# Patient Record
Sex: FEMALE | Race: OTHER | HISPANIC OR LATINO
[De-identification: names, ages, dates, MRNs, and addresses within clinical notes are randomized per-mention and may not be internally consistent; named-entity substitution may affect disease eponyms.]

---

## 2019-11-01 ENCOUNTER — APPOINTMENT (OUTPATIENT)
Dept: OBGYN | Facility: CLINIC | Age: 29
End: 2019-11-01

## 2019-11-01 PROBLEM — Z00.00 ENCOUNTER FOR PREVENTIVE HEALTH EXAMINATION: Status: ACTIVE | Noted: 2019-11-01

## 2020-01-07 ENCOUNTER — APPOINTMENT (OUTPATIENT)
Dept: ANTEPARTUM | Facility: CLINIC | Age: 30
End: 2020-01-07
Payer: COMMERCIAL

## 2020-01-07 ENCOUNTER — TRANSCRIPTION ENCOUNTER (OUTPATIENT)
Age: 30
End: 2020-01-07

## 2020-01-07 PROCEDURE — 76813 OB US NUCHAL MEAS 1 GEST: CPT

## 2020-01-07 PROCEDURE — 76801 OB US < 14 WKS SINGLE FETUS: CPT

## 2020-02-28 ENCOUNTER — APPOINTMENT (OUTPATIENT)
Dept: ANTEPARTUM | Facility: CLINIC | Age: 30
End: 2020-02-28
Payer: COMMERCIAL

## 2020-02-28 PROCEDURE — 76817 TRANSVAGINAL US OBSTETRIC: CPT

## 2020-02-28 PROCEDURE — 76815 OB US LIMITED FETUS(S): CPT

## 2020-03-07 ENCOUNTER — APPOINTMENT (OUTPATIENT)
Dept: ANTEPARTUM | Facility: CLINIC | Age: 30
End: 2020-03-07
Payer: COMMERCIAL

## 2020-03-07 PROCEDURE — 76816 OB US FOLLOW-UP PER FETUS: CPT

## 2020-06-23 ENCOUNTER — EMERGENCY (EMERGENCY)
Facility: HOSPITAL | Age: 30
LOS: 1 days | Discharge: STILL ADMITTED OR ADMINISTRATIVE | End: 2020-06-23
Admitting: EMERGENCY MEDICINE
Payer: COMMERCIAL

## 2020-06-23 ENCOUNTER — OUTPATIENT (OUTPATIENT)
Dept: INPATIENT UNIT | Facility: HOSPITAL | Age: 30
LOS: 1 days | Discharge: HOME | End: 2020-06-23
Payer: COMMERCIAL

## 2020-06-23 VITALS
TEMPERATURE: 98 F | DIASTOLIC BLOOD PRESSURE: 97 MMHG | HEART RATE: 93 BPM | SYSTOLIC BLOOD PRESSURE: 133 MMHG | RESPIRATION RATE: 20 BRPM | OXYGEN SATURATION: 100 %

## 2020-06-23 VITALS
HEART RATE: 133 BPM | RESPIRATION RATE: 16 BRPM | SYSTOLIC BLOOD PRESSURE: 127 MMHG | TEMPERATURE: 98 F | DIASTOLIC BLOOD PRESSURE: 76 MMHG

## 2020-06-23 VITALS — SYSTOLIC BLOOD PRESSURE: 122 MMHG | HEART RATE: 103 BPM | DIASTOLIC BLOOD PRESSURE: 72 MMHG

## 2020-06-23 DIAGNOSIS — Z3A.00 WEEKS OF GESTATION OF PREGNANCY NOT SPECIFIED: ICD-10-CM

## 2020-06-23 DIAGNOSIS — Z98.891 HISTORY OF UTERINE SCAR FROM PREVIOUS SURGERY: Chronic | ICD-10-CM

## 2020-06-23 DIAGNOSIS — O26.899 OTHER SPECIFIED PREGNANCY RELATED CONDITIONS, UNSPECIFIED TRIMESTER: ICD-10-CM

## 2020-06-23 PROCEDURE — 99283 EMERGENCY DEPT VISIT LOW MDM: CPT

## 2020-06-23 PROCEDURE — 93010 ELECTROCARDIOGRAM REPORT: CPT

## 2020-06-23 PROCEDURE — 99214 OFFICE O/P EST MOD 30 MIN: CPT

## 2020-06-23 RX ORDER — SODIUM CHLORIDE 9 MG/ML
1000 INJECTION, SOLUTION INTRAVENOUS
Refills: 0 | Status: COMPLETED | OUTPATIENT
Start: 2020-06-23 | End: 2020-06-23

## 2020-06-23 RX ADMIN — SODIUM CHLORIDE 999 MILLILITER(S): 9 INJECTION, SOLUTION INTRAVENOUS at 15:09

## 2020-06-23 NOTE — OB PROVIDER TRIAGE NOTE - NSOBPROVIDERNOTE_OBGYN_ALL_OB_FT
Patient is a 28y/o  @ 37 4/7wks gestation who reports to triage with c/o abdominal cramping x 2 wks; got worse over the past 3 days.  Denies lof, or vaginal bleeding.  Reports good fetal movements.    Scheduled for 3' C/S with BTL on 2020  Last ate @     AP Course uncomplicated  Meds - pnv, fa, melatonin and bonjesta  Allergy - asa; rash    PMH/GYN/SH - denies  OB  -C/S -  6lbs 8oz  -C/S - 8lbs 9oz  -SAB -     Vital Signs   T(C): 36.7 (2020 13:33), Max: 36.8 (2020 13:19)  T(F): 98.06 (2020 13:33), Max: 98.24 (2020 13:23)  HR: 124 (2020 13:55) (93 - 133)  BP: 131/87 (2020 13:45) (127/76 - 133/97)  RR: 16 (2020 13:19) (16 - 20)  SpO2: 99% (2020 13:55) (99% - 100%)    Abdomen gravid, soft and nontender  NST -  SVE - C/30/-3 Intact  TAS -  EKG    Discussed patient with   Plan:  number [id number]    Patient is a 30y/o  @ 37 4/7wks gestation who reports to triage with c/o abdominal cramping x 2 wks; got worse over the past 3 days.  Denies lof, or vaginal bleeding.  Reports good fetal movements.    Scheduled for 3' C/S with BTL on 2020  Last ate @ 1030.    AP Course uncomplicated  Meds - pnv, fa, melatonin and bonjesta  Allergy - asa; rash    PMH/GYN/SH - denies  OB  -C/S -  6lbs 8oz  -C/S - 8lbs 9oz  -SAB -     Vital Signs   T(C): 36.7 (2020 13:33), Max: 36.8 (2020 13:19)  T(F): 98.06 (2020 13:33), Max: 98.24 (2020 13:23)  HR: 124 (2020 13:55) (93 - 133)  BP: 131/87 (2020 13:45) (127/76 - 133/97)  RR: 16 (2020 13:19) (16 - 20)  SpO2: 99% (2020 13:55) (99% - 100%)  Maternal tachycardia noted.  Patient denies dyspnea, sob, chest pain, palpitations, syncopal episodes or light handedness.  Abdomen gravid, soft and nontender  NST - cat 1 fht  SVE - C/30/-3 Intact  Cephalic presentation  No evidence of labor  Orders - iv bolus and ECG    Abnormal ECG:  Accelerated Junctional rhythm, minimal voltage criteria for LVH, may be a normal variant.  Cardiology consult.  Cardiology paged.  Discussed findings and plan with Dr Bruce    1669: Dr Walker of Cardiology called.  Findings discussed with him.  States that he will get back to me.  number [id number]    Patient is a 30y/o  @ 37 4/7wks gestation who reports to triage with c/o abdominal cramping x 2 wks; got worse over the past 3 days.  Denies lof, or vaginal bleeding.  Reports good fetal movements.    Scheduled for 3' C/S with BTL on 2020  Last ate @ 1030.    AP Course uncomplicated  Meds - pnv, fa, melatonin and bonjesta  Allergy - asa; rash    PMH/GYN/SH - denies  OB  -C/S -  6lbs 8oz  -C/S - 8lbs 9oz  -SAB - 2016    Vital Signs   T(C): 36.7 (2020 13:33), Max: 36.8 (2020 13:19)  T(F): 98.06 (2020 13:33), Max: 98.24 (2020 13:23)  HR: 124 (2020 13:55) (93 - 133)  BP: 131/87 (2020 13:45) (127/76 - 133/97)  RR: 16 (2020 13:19) (16 - 20)  SpO2: 99% (2020 13:55) (99% - 100%)  Maternal tachycardia noted.  Afebrile.  Patient denies dyspnea, sob, chest pain, palpitations, syncopal episodes or light handedness.  Abdomen gravid, soft and nontender  NST - cat 1 fht  SVE - C/30/-3 Intact  Cephalic presentation  No evidence of labor  Orders - iv bolus and ECG    Abnormal ECG:  Accelerated Junctional rhythm, minimal voltage criteria for LVH, may be a normal variant.  Cardiology consult.  Cardiology paged.  Discussed findings and plan with Dr Bruce    5503: Dr Walker of Cardiology called.  Findings discussed with him.  States that he will get back to me.    1715  S/P 1 L Bolus of LR    T(C): 36.7 (2020 13:33), Max: 36.8 (2020 13:19)  T(F): 98.06 (2020 13:33), Max: 98.24 (2020 13:23)  HR: 105 (2020 17:11) (93 - 133)  Awaiting Cardiology call back.  number [phone]    Patient is a 30y/o  @ 37 4/7wks gestation who reports to triage with c/o abdominal cramping x 2 wks; got worse over the past 3 days.  Denies lof, or vaginal bleeding.  Reports good fetal movements.    Scheduled for 3' C/S with BTL on 2020  Last ate @ 1030.    AP Course uncomplicated  Meds - pnv, fa, melatonin and bonjesta  Allergy - asa; rash    PMH/GYN/SH - denies  OB  -C/S -  6lbs 8oz  -C/S - 8lbs 9oz  -SAB - 2016    Vital Signs   T(C): 36.7 (2020 13:33), Max: 36.8 (2020 13:19)  T(F): 98.06 (2020 13:33), Max: 98.24 (2020 13:23)  HR: 124 (2020 13:55) (93 - 133)  BP: 131/87 (2020 13:45) (127/76 - 133/97)  RR: 16 (2020 13:19) (16 - 20)  SpO2: 99% (2020 13:55) (99% - 100%)  Maternal tachycardia noted.  Afebrile.  Patient denies dyspnea, sob, chest pain, palpitations, syncopal episodes or light handedness.  Abdomen gravid, soft and nontender  NST - cat 1 fht  SVE - C/30/-3 Intact  Cephalic presentation  No evidence of labor  Orders - iv bolus and ECG    Abnormal ECG:  Accelerated Junctional rhythm, minimal voltage criteria for LVH, may be a normal variant.  Cardiology consult.  Cardiology paged.  Discussed findings and plan with Dr Bruce    6524: Dr Walkre of Cardiology called.  Findings discussed with him.  States that he will get back to me.    171  S/P 1 L Bolus of LR    T(C): 36.7 (2020 13:33), Max: 36.8 (2020 13:19)  T(F): 98.06 (2020 13:33), Max: 98.24 (2020 13:23)  HR: 105 (2020 17:11) (93 - 133)  Awaiting Cardiology call back.     :  s/p cardiology consult.  Patient's tachycardia may have resulted from pain.  Please see cardiology note.  Discussed patient with Dr Bruce.  Patient is cleared for discharge home.  Patient instructed to return to triage if ROM or contractions.

## 2020-06-23 NOTE — CHART NOTE - NSCHARTNOTEFT_GEN_A_CORE
1315:  28yo patient present to triage with c/o lower abdominal pelvic pain/pressure and back pain for 3 days.  Denies LOF, VB and reports GFM.  H/O Previous C/S x 2  Patient appears in NAD  Abdomen soft, NT, gravid.   FH check 150's  T-37.2, /75, P128  Patient brought to DT2 for evaluation  -OCLLINS Hernandez NP

## 2020-06-23 NOTE — OB PROVIDER TRIAGE NOTE - NSHPPHYSICALEXAM_GEN_ALL_CORE
Vital Signs   T(C): 36.7 (23 Jun 2020 13:33), Max: 36.8 (23 Jun 2020 13:19)  T(F): 98.06 (23 Jun 2020 13:33), Max: 98.24 (23 Jun 2020 13:23)  HR: 124 (23 Jun 2020 13:55) (93 - 133)  BP: 131/87 (23 Jun 2020 13:45) (127/76 - 133/97)  RR: 16 (23 Jun 2020 13:19) (16 - 20)  SpO2: 99% (23 Jun 2020 13:55) (99% - 100%)    Maternal tachycardia noted.  Patient denies dyspnea, sob, chest pain, palpitations, syncopal episodes or light handedness.  Abdomen gravid, soft and nontender  NST - cat 1 fht  SVE - C/30/-3 Intact  Cephalic presentation  Orders - iv bolus and ECG

## 2020-06-23 NOTE — CONSULT NOTE ADULT - SUBJECTIVE AND OBJECTIVE BOX
Cardiology Attending Consult Note      CHIEF COMPLAINT/REASON FOR CONSULT: Tachycardia  Date of Admission: 20    Bengali Translation service utilized: [employer] # [id number]    HISTORY OF PRESENT ILLNESS:    29y.o. Female with no PMH now presenting at @37weeks, reports pre-eclampsia during prior pregnancy, now admitted with abdominal/pelvic pain x 2-3 days. She reports that over the last week she has felt more fatigued, and notices her heart rate increases with exercise. She denies any chest pain or dyspnea on exertion. No SOB. No N/V/D/F.C. No HA. No palpitations. At the time of my visit HR improved to 100-105 after IVF.     EKG: NSR@125, no ST or TW changes.     ALLERGIES:  aspirin (Rash)    Home Medications:  Prena1 oral capsule: 1 cap(s) orally once a day (2020 13:38)      MEDICATIONS:                  PAST MEDICAL & SURGICAL HISTORY:  Spontaneous : x 1  Previous  section: c/s 2008 m 6-8 nrfht  2012 repeat f 8-9 pec      FAMILY HISTORY:  Grandmother with hx of tachycardia     SOCIAL HISTORY:    Never smoked, social ETOH, no IVDU    REVIEW OF SYSTEMS:    CONSTITUTIONAL: +weakness, No fevers or chills  EYES/ENT: No visual changes;  No vertigo or throat pain   NECK: No pain or stiffness  RESPIRATORY: No cough, wheezing, hemoptysis; No shortness of breath  CARDIOVASCULAR: No chest pain or palpitations  GASTROINTESTINAL: +abdominal pain , no epigastric pain. No nausea, vomiting, or hematemesis; No diarrhea or constipation. No melena or hematochezia.  GENITOURINARY: No dysuria, frequency or hematuria  NEUROLOGICAL: No numbness or weakness  SKIN: No itching, burning, rashes, or lesions   All other review of systems is negative unless indicated above.    PHYSICAL EXAM:  T(C): 36.7 (20 @ 13:33), Max: 36.8 (20 @ 13:19)  HR: 103 (20 @ 17:47) (93 - 133)  BP: 122/72 (20 @ 17:47) (104/60 - 134/77)  RR: 16 (20 @ 13:19) (16 - 20)  SpO2: 100% (06-23-20 @ 17:40) (90% - 100%)  Wt(kg): --    Drug Dosing Weight      I&O's Summary      Appearance: Normal	  HEENT:   Normal oral mucosa, PERRL, EOMI	  Lymphatic: No lymphadenopathy  Cardiovascular: Normal S1 S2, No JVD, No murmurs  Respiratory: Lungs clear to auscultation	  Psychiatry: A & O x 3, Mood & affect appropriate  Gastrointestinal:  Soft, Non-tender, + BS	  Skin: No rashes, No ecchymoses, No cyanosis	  Neurologic: Non-focal  Extremities: Normal range of motion, No clubbing, cyanosis or edema  Vascular: Peripheral pulses palpable 2+ bilaterally    LABS:	 	                        EKG: NSR@120's, no ST or TW changes.     Telemetry: None    CXR: None    TTE: None      A/P:  29y.o. Female with no PMH now presenting at @37weeks, reports pre-eclampsia during prior pregnancy, now admitted with abdominal/pelvic pain x 2-3 days. She reports that over the last week she has felt more fatigued, and notices her heart rate increases with exercise. She denies any chest pain or dyspnea on exertion. No SOB. No N/V/D/F.C. No HA. No palpitations. At the time of my visit HR improved to 100-105 after IVF.     1. Sinus Tachycardia - Suspect Sinus Tachycardia in the setting of pregnancy related hemodynamic shifts, pain (abdominal/pelvic pain), deconditioning (did not exercise before or during pregnancy), dehydration.  -HR improved with IVF.  -Cont Supportive care.  -No indication for further cardiac testing at this time. No evidence of volume overload or murmur on exam.     Cardiology will sign off at this time. Please re-consult as needed.    	  Alberto Bill MD  Cardiology Attending  [facility] / [facility],  [facility]   Cell: [phone]

## 2020-06-23 NOTE — OB PROVIDER TRIAGE NOTE - HISTORY OF PRESENT ILLNESS
Patient is a 30y/o  @ 37 4/7wks gestation who reports to triage with c/o abdominal cramping x 2 wks; got worse over the past 3 days.  Denies lof, or vaginal bleeding.  Reports good fetal movements.    Scheduled for 3' C/S on 2020  Last ate @     AP Course uncomplicated  Meds - pnv, fa, melatonin and bonjesta  Allergy - asa; rash  number [id number]    Patient is a 30y/o  @ 37 4/7wks gestation who reports to triage with c/o abdominal cramping x 2 wks; got worse over the past 3 days.  Denies lof, or vaginal bleeding.  Reports good fetal movements.    Scheduled for 3' C/S on 2020  Last ate @ 10:30    AP Course uncomplicated  Meds - pnv, fa, melatonin and bonjesta  Allergy - asa; rash

## 2020-06-23 NOTE — OB PROVIDER TRIAGE NOTE - ADDITIONAL INSTRUCTIONS
Discussed patient with Dr Bruce.  Patient is cleared for discharge home.  Patient instructed to return to triage if ROM or contractions.

## 2020-07-01 ENCOUNTER — INPATIENT (INPATIENT)
Facility: HOSPITAL | Age: 30
LOS: 2 days | Discharge: HOME | End: 2020-07-04
Attending: OBSTETRICS & GYNECOLOGY | Admitting: OBSTETRICS & GYNECOLOGY
Payer: COMMERCIAL

## 2020-07-01 ENCOUNTER — TRANSCRIPTION ENCOUNTER (OUTPATIENT)
Age: 30
End: 2020-07-01

## 2020-07-01 ENCOUNTER — EMERGENCY (EMERGENCY)
Facility: HOSPITAL | Age: 30
LOS: 1 days | Discharge: STILL ADMITTED OR ADMINISTRATIVE | End: 2020-07-01
Admitting: EMERGENCY MEDICINE

## 2020-07-01 VITALS — DIASTOLIC BLOOD PRESSURE: 70 MMHG | HEART RATE: 127 BPM | SYSTOLIC BLOOD PRESSURE: 125 MMHG

## 2020-07-01 VITALS
TEMPERATURE: 99 F | SYSTOLIC BLOOD PRESSURE: 134 MMHG | HEART RATE: 123 BPM | OXYGEN SATURATION: 100 % | RESPIRATION RATE: 17 BRPM | DIASTOLIC BLOOD PRESSURE: 84 MMHG

## 2020-07-01 DIAGNOSIS — Z98.891 HISTORY OF UTERINE SCAR FROM PREVIOUS SURGERY: Chronic | ICD-10-CM

## 2020-07-01 DIAGNOSIS — Z3A.00 WEEKS OF GESTATION OF PREGNANCY NOT SPECIFIED: ICD-10-CM

## 2020-07-01 DIAGNOSIS — O26.899 OTHER SPECIFIED PREGNANCY RELATED CONDITIONS, UNSPECIFIED TRIMESTER: ICD-10-CM

## 2020-07-01 RX ORDER — SODIUM CHLORIDE 9 MG/ML
1000 INJECTION, SOLUTION INTRAVENOUS
Refills: 0 | Status: DISCONTINUED | OUTPATIENT
Start: 2020-07-01 | End: 2020-07-01

## 2020-07-01 RX ORDER — OXYTOCIN 10 UNIT/ML
VIAL (ML) INJECTION
Qty: 20 | Refills: 0 | Status: DISCONTINUED | OUTPATIENT
Start: 2020-07-01 | End: 2020-07-01

## 2020-07-01 NOTE — OB PROVIDER TRIAGE NOTE - NSOBPROVIDERNOTE_OBGYN_ALL_OB_FT
29 yr old  @ 38.5 wks, r/o labor  schedule rpt  x 3, 2020 29 yr old  @ 38.5 wks, r/o labor  schedule rpt  x 3, 2020    @ 11:31  CAt 1 tracing, periods of indeterminate baseline  TOCO: ctx none  BP: 143/83 (single episode)  Discussed with Dr. Basia NAYAK labs  Repeat VE 29 yr old  @ 38.5 wks, r/o labor  schedule rpt  x 3, 2020    @ 11:31  CAt 1 tracing, periods of indeterminate baseline  TOCO: ctx none  BP: 143/83 (single episode)  Discussed with Dr. Flor  [facility] labs    @ 0200  CAT 2 tracing as per Dr. Flor  TOCO: ctx none  Pre-op for repeat

## 2020-07-01 NOTE — OB PROVIDER TRIAGE NOTE - NS_FETALMOVEMENT_OBGYN_ALL_OB
1. Have you been to the ER, urgent care clinic since your last visit? Hospitalized since your last visit? [facility] ED 6/29/19    2. Have you seen or consulted any other health care providers outside of the [address] [place] [address] since your last visit? Include any pap smears or colon screening.  No    Chief Complaint   Patient presents with    Vaginal Discharge     watery clear discharge with odor x 1 day - history of BV Absent or reduced

## 2020-07-01 NOTE — ED ADULT TRIAGE NOTE - CHIEF COMPLAINT QUOTE
pt arrives 38 weeks pregnant GRACE 7/10/20, pt states here 3rd pregnancy denies any vaginal bleeding or water breaking. SPoke with L&D pt to be seen in L&D

## 2020-07-01 NOTE — OB PROVIDER TRIAGE NOTE - NSHPLABSRESULTS_GEN_ALL_CORE
Vital Signs Last 24 Hrs  T(C): 37.3 (01 Jul 2020 20:49), Max: 37.4 (01 Jul 2020 20:26)  T(F): 99.1 (01 Jul 2020 20:49), Max: 99.3 (01 Jul 2020 20:26)  HR: 116 (01 Jul 2020 23:27) (105 - 127)  BP: 143/82 (01 Jul 2020 23:19) (125/70 - 143/82)  BP(mean): --  RR: 16 (01 Jul 2020 20:49) (16 - 17)  SpO2: 100% (01 Jul 2020 23:27) (92% - 100%) Vital Signs Last 24 Hrs  T(C): 37.3 (2020 20:49), Max: 37.4 (2020 20:26)  T(F): 99.1 (2020 20:49), Max: 99.3 (2020 20:26)  HR: 116 (2020 23:27) (105 - 127)  BP: 143/82 (2020 23:19) (125/70 - 143/82)  BP(mean): --  RR: 16 (2020 20:49) (16 - 17)  SpO2: 100% (2020 23:27) (92% - 100%)                            11.6   13.88 )-----------( 377      ( 2020 23:50 )             37.6         137  |  104  |  7   ----------------------------<  85  3.7   |  18<L>  |  0.47<L>    Ca    9.2      2020 23:50    TPro  6.8  /  Alb  3.4  /  TBili  0.5  /  DBili  x   /  AST  14  /  ALT  9   /  AlkPhos  291<H>            Urinalysis Basic - ( 2020 00:23 )    Color: YELLOW / Appearance: Lt TURBID / S.036 / pH: 6.0  Gluc: NEGATIVE / Ketone: MODERATE  / Bili: NEGATIVE / Urobili: TRACE   Blood: NEGATIVE / Protein: 100 / Nitrite: NEGATIVE   Leuk Esterase: SMALL / RBC: 3-5 / WBC 11-25   Sq Epi: MODERATE / Non Sq Epi: x / Bacteria: SMALL      PT/INR - ( 2020 23:50 )   PT: 11.3 SEC;   INR: 0.98          PTT - ( 2020 23:50 )  PTT:30.3 SEC    CAPILLARY BLOOD GLUCOSE     creatinine    Protein, Random Urine: 57.0 mg/dL (20 @ 00:23)    P/c ratio

## 2020-07-01 NOTE — OB PROVIDER TRIAGE NOTE - HISTORY OF PRESENT ILLNESS
29 yr old  @ 38.5 wks. presents with ctx q 30 mins throughout the day, also with decreased fetal movement. Now feeling movement. Denies VB/LOF.   PNI: elevated BP in office, evaluated for PEC in Triage last week, denies 24 hr urine or dx of PEC, GBS positive in urine  Obhx:  , failure to descent @ 8 cm, FT             rpt  , FT, PEC no meds  Gynhx: denies  Surghx: c-sction x2  Medhx: denies

## 2020-07-02 ENCOUNTER — RESULT REVIEW (OUTPATIENT)
Age: 30
End: 2020-07-02

## 2020-07-02 ENCOUNTER — TRANSCRIPTION ENCOUNTER (OUTPATIENT)
Age: 30
End: 2020-07-02

## 2020-07-02 PROBLEM — O03.9 COMPLETE OR UNSPECIFIED SPONTANEOUS ABORTION WITHOUT COMPLICATION: Chronic | Status: ACTIVE | Noted: 2020-06-23

## 2020-07-02 LAB
ALBUMIN SERPL ELPH-MCNC: 3.4 G/DL — SIGNIFICANT CHANGE UP (ref 3.3–5)
ALP SERPL-CCNC: 291 U/L — HIGH (ref 40–120)
ALT FLD-CCNC: 9 U/L — SIGNIFICANT CHANGE UP (ref 4–33)
ANION GAP SERPL CALC-SCNC: 15 MMO/L — HIGH (ref 7–14)
APPEARANCE UR: SIGNIFICANT CHANGE UP
APTT BLD: 30.3 SEC — SIGNIFICANT CHANGE UP (ref 27–36.3)
AST SERPL-CCNC: 14 U/L — SIGNIFICANT CHANGE UP (ref 4–32)
BACTERIA # UR AUTO: SIGNIFICANT CHANGE UP
BASOPHILS # BLD AUTO: 0.06 K/UL — SIGNIFICANT CHANGE UP (ref 0–0.2)
BASOPHILS NFR BLD AUTO: 0.4 % — SIGNIFICANT CHANGE UP (ref 0–2)
BILIRUB SERPL-MCNC: 0.5 MG/DL — SIGNIFICANT CHANGE UP (ref 0.2–1.2)
BILIRUB UR-MCNC: NEGATIVE — SIGNIFICANT CHANGE UP
BLD GP AB SCN SERPL QL: NEGATIVE — SIGNIFICANT CHANGE UP
BLOOD UR QL VISUAL: NEGATIVE — SIGNIFICANT CHANGE UP
BUN SERPL-MCNC: 7 MG/DL — SIGNIFICANT CHANGE UP (ref 7–23)
CALCIUM SERPL-MCNC: 9.2 MG/DL — SIGNIFICANT CHANGE UP (ref 8.4–10.5)
CHLORIDE SERPL-SCNC: 104 MMOL/L — SIGNIFICANT CHANGE UP (ref 98–107)
CO2 SERPL-SCNC: 18 MMOL/L — LOW (ref 22–31)
COLOR SPEC: YELLOW — SIGNIFICANT CHANGE UP
CREAT ?TM UR-MCNC: 280.1 MG/DL — SIGNIFICANT CHANGE UP
CREAT SERPL-MCNC: 0.47 MG/DL — LOW (ref 0.5–1.3)
EOSINOPHIL # BLD AUTO: 0.14 K/UL — SIGNIFICANT CHANGE UP (ref 0–0.5)
EOSINOPHIL NFR BLD AUTO: 1 % — SIGNIFICANT CHANGE UP (ref 0–6)
FIBRINOGEN PPP-MCNC: 810 MG/DL — HIGH (ref 300–520)
GLUCOSE SERPL-MCNC: 85 MG/DL — SIGNIFICANT CHANGE UP (ref 70–99)
GLUCOSE UR-MCNC: NEGATIVE — SIGNIFICANT CHANGE UP
HCT VFR BLD CALC: 32.1 % — LOW (ref 34.5–45)
HCT VFR BLD CALC: 37.6 % — SIGNIFICANT CHANGE UP (ref 34.5–45)
HGB BLD-MCNC: 10.1 G/DL — LOW (ref 11.5–15.5)
HGB BLD-MCNC: 11.6 G/DL — SIGNIFICANT CHANGE UP (ref 11.5–15.5)
HYALINE CASTS # UR AUTO: HIGH
IMM GRANULOCYTES NFR BLD AUTO: 1.2 % — SIGNIFICANT CHANGE UP (ref 0–1.5)
INR BLD: 0.98 — SIGNIFICANT CHANGE UP (ref 0.88–1.17)
KETONES UR-MCNC: HIGH
LDH SERPL L TO P-CCNC: 167 U/L — SIGNIFICANT CHANGE UP (ref 135–225)
LEUKOCYTE ESTERASE UR-ACNC: SIGNIFICANT CHANGE UP
LYMPHOCYTES # BLD AUTO: 1.77 K/UL — SIGNIFICANT CHANGE UP (ref 1–3.3)
LYMPHOCYTES # BLD AUTO: 12.8 % — LOW (ref 13–44)
MCHC RBC-ENTMCNC: 22.3 PG — LOW (ref 27–34)
MCHC RBC-ENTMCNC: 22.6 PG — LOW (ref 27–34)
MCHC RBC-ENTMCNC: 30.9 % — LOW (ref 32–36)
MCHC RBC-ENTMCNC: 31.5 % — LOW (ref 32–36)
MCV RBC AUTO: 71.8 FL — LOW (ref 80–100)
MCV RBC AUTO: 72.2 FL — LOW (ref 80–100)
MONOCYTES # BLD AUTO: 0.97 K/UL — HIGH (ref 0–0.9)
MONOCYTES NFR BLD AUTO: 7 % — SIGNIFICANT CHANGE UP (ref 2–14)
MUCOUS THREADS # UR AUTO: SIGNIFICANT CHANGE UP
NEUTROPHILS # BLD AUTO: 10.78 K/UL — HIGH (ref 1.8–7.4)
NEUTROPHILS NFR BLD AUTO: 77.6 % — HIGH (ref 43–77)
NITRITE UR-MCNC: NEGATIVE — SIGNIFICANT CHANGE UP
NRBC # FLD: 0 K/UL — SIGNIFICANT CHANGE UP (ref 0–0)
NRBC # FLD: 0.02 K/UL — SIGNIFICANT CHANGE UP (ref 0–0)
PH UR: 6 — SIGNIFICANT CHANGE UP (ref 5–8)
PLATELET # BLD AUTO: 293 K/UL — SIGNIFICANT CHANGE UP (ref 150–400)
PLATELET # BLD AUTO: 377 K/UL — SIGNIFICANT CHANGE UP (ref 150–400)
PMV BLD: 9.6 FL — SIGNIFICANT CHANGE UP (ref 7–13)
PMV BLD: 9.7 FL — SIGNIFICANT CHANGE UP (ref 7–13)
POTASSIUM SERPL-MCNC: 3.7 MMOL/L — SIGNIFICANT CHANGE UP (ref 3.5–5.3)
POTASSIUM SERPL-SCNC: 3.7 MMOL/L — SIGNIFICANT CHANGE UP (ref 3.5–5.3)
PROT SERPL-MCNC: 6.8 G/DL — SIGNIFICANT CHANGE UP (ref 6–8.3)
PROT UR-MCNC: 100 — HIGH
PROT UR-MCNC: 57 MG/DL — SIGNIFICANT CHANGE UP
PROTHROM AB SERPL-ACNC: 11.3 SEC — SIGNIFICANT CHANGE UP (ref 9.8–13.1)
RBC # BLD: 4.47 M/UL — SIGNIFICANT CHANGE UP (ref 3.8–5.2)
RBC # BLD: 5.21 M/UL — HIGH (ref 3.8–5.2)
RBC # FLD: 15 % — HIGH (ref 10.3–14.5)
RBC # FLD: 15.1 % — HIGH (ref 10.3–14.5)
RBC CASTS # UR COMP ASSIST: SIGNIFICANT CHANGE UP (ref 0–?)
RH IG SCN BLD-IMP: POSITIVE — SIGNIFICANT CHANGE UP
SARS-COV-2 RNA SPEC QL NAA+PROBE: SIGNIFICANT CHANGE UP
SODIUM SERPL-SCNC: 137 MMOL/L — SIGNIFICANT CHANGE UP (ref 135–145)
SP GR SPEC: 1.04 — SIGNIFICANT CHANGE UP (ref 1–1.04)
SQUAMOUS # UR AUTO: SIGNIFICANT CHANGE UP
T PALLIDUM AB TITR SER: NEGATIVE — SIGNIFICANT CHANGE UP
URATE SERPL-MCNC: 6.5 MG/DL — SIGNIFICANT CHANGE UP (ref 2.5–7)
UROBILINOGEN FLD QL: SIGNIFICANT CHANGE UP
WBC # BLD: 13.88 K/UL — HIGH (ref 3.8–10.5)
WBC # BLD: 16.62 K/UL — HIGH (ref 3.8–10.5)
WBC # FLD AUTO: 13.88 K/UL — HIGH (ref 3.8–10.5)
WBC # FLD AUTO: 16.62 K/UL — HIGH (ref 3.8–10.5)
WBC UR QL: HIGH (ref 0–?)

## 2020-07-02 PROCEDURE — 88302 TISSUE EXAM BY PATHOLOGIST: CPT | Mod: 26

## 2020-07-02 RX ORDER — IBUPROFEN 200 MG
600 TABLET ORAL EVERY 6 HOURS
Refills: 0 | Status: DISCONTINUED | OUTPATIENT
Start: 2020-07-02 | End: 2020-07-03

## 2020-07-02 RX ORDER — OXYTOCIN 10 UNIT/ML
333.33 VIAL (ML) INJECTION
Qty: 20 | Refills: 0 | Status: DISCONTINUED | OUTPATIENT
Start: 2020-07-02 | End: 2020-07-03

## 2020-07-02 RX ORDER — SODIUM CHLORIDE 9 MG/ML
1000 INJECTION, SOLUTION INTRAVENOUS ONCE
Refills: 0 | Status: COMPLETED | OUTPATIENT
Start: 2020-07-02 | End: 2020-07-02

## 2020-07-02 RX ORDER — OXYTOCIN 10 UNIT/ML
333.33 VIAL (ML) INJECTION
Qty: 20 | Refills: 0 | Status: DISCONTINUED | OUTPATIENT
Start: 2020-07-02 | End: 2020-07-02

## 2020-07-02 RX ORDER — ONDANSETRON 8 MG/1
4 TABLET, FILM COATED ORAL ONCE
Refills: 0 | Status: COMPLETED | OUTPATIENT
Start: 2020-07-02 | End: 2020-07-02

## 2020-07-02 RX ORDER — DIPHENHYDRAMINE HCL 50 MG
25 CAPSULE ORAL EVERY 6 HOURS
Refills: 0 | Status: DISCONTINUED | OUTPATIENT
Start: 2020-07-02 | End: 2020-07-04

## 2020-07-02 RX ORDER — SODIUM CHLORIDE 9 MG/ML
500 INJECTION, SOLUTION INTRAVENOUS ONCE
Refills: 0 | Status: COMPLETED | OUTPATIENT
Start: 2020-07-02 | End: 2020-07-02

## 2020-07-02 RX ORDER — HEPARIN SODIUM 5000 [USP'U]/ML
5000 INJECTION INTRAVENOUS; SUBCUTANEOUS EVERY 12 HOURS
Refills: 0 | Status: DISCONTINUED | OUTPATIENT
Start: 2020-07-02 | End: 2020-07-04

## 2020-07-02 RX ORDER — METOCLOPRAMIDE HCL 10 MG
10 TABLET ORAL ONCE
Refills: 0 | Status: COMPLETED | OUTPATIENT
Start: 2020-07-02 | End: 2020-07-02

## 2020-07-02 RX ORDER — CITRIC ACID/SODIUM CITRATE 300-500 MG
30 SOLUTION, ORAL ORAL ONCE
Refills: 0 | Status: DISCONTINUED | OUTPATIENT
Start: 2020-07-02 | End: 2020-07-02

## 2020-07-02 RX ORDER — SODIUM CHLORIDE 9 MG/ML
1000 INJECTION, SOLUTION INTRAVENOUS
Refills: 0 | Status: DISCONTINUED | OUTPATIENT
Start: 2020-07-02 | End: 2020-07-02

## 2020-07-02 RX ORDER — OXYCODONE HYDROCHLORIDE 5 MG/1
5 TABLET ORAL ONCE
Refills: 0 | Status: DISCONTINUED | OUTPATIENT
Start: 2020-07-02 | End: 2020-07-04

## 2020-07-02 RX ORDER — LANOLIN
1 OINTMENT (GRAM) TOPICAL EVERY 6 HOURS
Refills: 0 | Status: DISCONTINUED | OUTPATIENT
Start: 2020-07-02 | End: 2020-07-04

## 2020-07-02 RX ORDER — FAMOTIDINE 10 MG/ML
20 INJECTION INTRAVENOUS ONCE
Refills: 0 | Status: DISCONTINUED | OUTPATIENT
Start: 2020-07-02 | End: 2020-07-02

## 2020-07-02 RX ORDER — DEXAMETHASONE 0.5 MG/5ML
4 ELIXIR ORAL ONCE
Refills: 0 | Status: COMPLETED | OUTPATIENT
Start: 2020-07-02 | End: 2020-07-02

## 2020-07-02 RX ORDER — FAMOTIDINE 10 MG/ML
20 INJECTION INTRAVENOUS ONCE
Refills: 0 | Status: COMPLETED | OUTPATIENT
Start: 2020-07-02 | End: 2020-07-02

## 2020-07-02 RX ORDER — SODIUM CHLORIDE 9 MG/ML
1000 INJECTION, SOLUTION INTRAVENOUS
Refills: 0 | Status: DISCONTINUED | OUTPATIENT
Start: 2020-07-02 | End: 2020-07-03

## 2020-07-02 RX ORDER — MAGNESIUM HYDROXIDE 400 MG/1
30 TABLET, CHEWABLE ORAL
Refills: 0 | Status: DISCONTINUED | OUTPATIENT
Start: 2020-07-02 | End: 2020-07-04

## 2020-07-02 RX ORDER — SIMETHICONE 80 MG/1
80 TABLET, CHEWABLE ORAL EVERY 4 HOURS
Refills: 0 | Status: DISCONTINUED | OUTPATIENT
Start: 2020-07-02 | End: 2020-07-04

## 2020-07-02 RX ORDER — TETANUS TOXOID, REDUCED DIPHTHERIA TOXOID AND ACELLULAR PERTUSSIS VACCINE, ADSORBED 5; 2.5; 8; 8; 2.5 [IU]/.5ML; [IU]/.5ML; UG/.5ML; UG/.5ML; UG/.5ML
0.5 SUSPENSION INTRAMUSCULAR ONCE
Refills: 0 | Status: DISCONTINUED | OUTPATIENT
Start: 2020-07-02 | End: 2020-07-04

## 2020-07-02 RX ORDER — ACETAMINOPHEN 500 MG
975 TABLET ORAL
Refills: 0 | Status: DISCONTINUED | OUTPATIENT
Start: 2020-07-02 | End: 2020-07-04

## 2020-07-02 RX ORDER — HYDROMORPHONE HYDROCHLORIDE 2 MG/ML
0.5 INJECTION INTRAMUSCULAR; INTRAVENOUS; SUBCUTANEOUS
Refills: 0 | Status: DISCONTINUED | OUTPATIENT
Start: 2020-07-02 | End: 2020-07-02

## 2020-07-02 RX ORDER — KETOROLAC TROMETHAMINE 30 MG/ML
30 SYRINGE (ML) INJECTION EVERY 6 HOURS
Refills: 0 | Status: COMPLETED | OUTPATIENT
Start: 2020-07-02 | End: 2020-07-03

## 2020-07-02 RX ORDER — HYDROMORPHONE HYDROCHLORIDE 2 MG/ML
1 INJECTION INTRAMUSCULAR; INTRAVENOUS; SUBCUTANEOUS
Refills: 0 | Status: DISCONTINUED | OUTPATIENT
Start: 2020-07-02 | End: 2020-07-02

## 2020-07-02 RX ORDER — CITRIC ACID/SODIUM CITRATE 300-500 MG
30 SOLUTION, ORAL ORAL ONCE
Refills: 0 | Status: COMPLETED | OUTPATIENT
Start: 2020-07-02 | End: 2020-07-02

## 2020-07-02 RX ORDER — METOCLOPRAMIDE HCL 10 MG
10 TABLET ORAL ONCE
Refills: 0 | Status: DISCONTINUED | OUTPATIENT
Start: 2020-07-02 | End: 2020-07-02

## 2020-07-02 RX ORDER — OXYCODONE HYDROCHLORIDE 5 MG/1
5 TABLET ORAL
Refills: 0 | Status: DISCONTINUED | OUTPATIENT
Start: 2020-07-02 | End: 2020-07-04

## 2020-07-02 RX ADMIN — Medication 4 MILLIGRAM(S): at 17:46

## 2020-07-02 RX ADMIN — Medication 10 MILLIGRAM(S): at 03:58

## 2020-07-02 RX ADMIN — FAMOTIDINE 20 MILLIGRAM(S): 10 INJECTION INTRAVENOUS at 08:12

## 2020-07-02 RX ADMIN — SODIUM CHLORIDE 1000 MILLILITER(S): 9 INJECTION, SOLUTION INTRAVENOUS at 14:00

## 2020-07-02 RX ADMIN — Medication 30 MILLILITER(S): at 08:48

## 2020-07-02 RX ADMIN — SODIUM CHLORIDE 125 MILLILITER(S): 9 INJECTION, SOLUTION INTRAVENOUS at 17:26

## 2020-07-02 RX ADMIN — Medication 975 MILLIGRAM(S): at 20:28

## 2020-07-02 RX ADMIN — FAMOTIDINE 20 MILLIGRAM(S): 10 INJECTION INTRAVENOUS at 03:58

## 2020-07-02 RX ADMIN — HYDROMORPHONE HYDROCHLORIDE 1 MILLIGRAM(S): 2 INJECTION INTRAMUSCULAR; INTRAVENOUS; SUBCUTANEOUS at 14:55

## 2020-07-02 RX ADMIN — SODIUM CHLORIDE 125 MILLILITER(S): 9 INJECTION, SOLUTION INTRAVENOUS at 07:27

## 2020-07-02 RX ADMIN — SODIUM CHLORIDE 2000 MILLILITER(S): 9 INJECTION, SOLUTION INTRAVENOUS at 03:40

## 2020-07-02 RX ADMIN — ONDANSETRON 4 MILLIGRAM(S): 8 TABLET, FILM COATED ORAL at 15:58

## 2020-07-02 RX ADMIN — SODIUM CHLORIDE 1000 MILLILITER(S): 9 INJECTION, SOLUTION INTRAVENOUS at 17:25

## 2020-07-02 RX ADMIN — HEPARIN SODIUM 5000 UNIT(S): 5000 INJECTION INTRAVENOUS; SUBCUTANEOUS at 16:41

## 2020-07-02 RX ADMIN — SODIUM CHLORIDE 1000 MILLILITER(S): 9 INJECTION, SOLUTION INTRAVENOUS at 12:30

## 2020-07-02 RX ADMIN — Medication 10 MILLIGRAM(S): at 08:12

## 2020-07-02 NOTE — OB NEONATOLOGY/PEDIATRICIAN DELIVERY SUMMARY - NSPEDSNEONOTESA_OBGYN_ALL_OB_FT
38w6d GA infant delivered by  to a 31 y/o  O+ mother. Unremarkable medical hx. OB hx significant for prior 2 C-Sections and PEC in . PNL: GBS + (urine), neg/neg/NR/Imm. COVID pending. Mother presented this morning with labor, but no ROM. Maternal Tmax 37.1. AROM at the time of delivery to clear fluid. Infant delivered breech, W/D/S/S. Apgars 7/9 38w6d GA infant delivered by  to a 31 y/o  O+ mother. Unremarkable medical hx. OB hx significant for prior 2 C-Sections and PEC in . PNL: GBS + (urine), neg/neg/NR/Imm. COVID pending. Mother presented this morning with labor, but no ROM. Maternal Tmax 37.1. AROM at the time of delivery to clear fluid. Infant delivered breech, W/D/S/S. Apgars 7/9. Mother would like to breast feed and Hep B Vaccine for her infant. Pediatrician [facility]. EOS 0.08

## 2020-07-02 NOTE — PROVIDER CONTACT NOTE (OTHER) - ACTION/TREATMENT ORDERED:
Dr. Wylie at bedside. 500cc bolus and CBC. Dr. Wylie at bedside. 500cc bolus and CBC and orthostatics  at 1430:  lying: /65 HR 98  sitting /66   Patient denies feeling dizzy at this time

## 2020-07-02 NOTE — CHART NOTE - NSCHARTNOTEFT_GEN_A_CORE
R1 Postpartum Event Note    30 y/o  s/p rLTCS and tubal ligation, called to evaluate patient for decreased UOP. Patient had EBL of 500 and QBL of 350 in the OR. She received 3L of fluids in the OR and had 100ml of urine output while she was there. Her UOP has been 30 and 35 ml/hr over the past 2 hours, despite 500 cc bolus at 12:30. Patient reports LH and some facial itching, denies CP, SOB, HA, increased vaginal bleeding.     On exam patient appears comfortable, fundus is firm and non-tender, pad is not soaked.     Plan:  - 500 cc bolus  - CBC  - orthostatics in the PACU  - continue to monitor UOP    Plan d/w Dr. Bruce

## 2020-07-02 NOTE — OB PROVIDER H&P - ASSESSMENT
29 yr old  @ 38.5 wks, r/o labor  schedule rpt  x 3, 2020    @ 11:31  CAt 1 tracing, periods of indeterminate baseline  TOCO: ctx none  BP: 143/83 (single episode)  Discussed with Dr. Basia NAYAK labs    @ 0200  Admit  GBS positive in urine  CAT 2 tracing as per Dr. Flor  TOCO: ctx none  Pre-op for repeat   Admission labs  COVID x1 29 yr old  @ 38.5 wks, r/o labor  schedule rpt  x 3, ,     @ 11:31  CAt 1 tracing, periods of indeterminate baseline  TOCO: ctx none  BP: 143/83 (single episode)  Discussed with Dr. Basia NAYAK labs    @ 0200  Admit  GBS positive in urine  CAT 2 tracing as per Dr. Flor  TOCO: ctx none  Pre-op for repeat   Admission labs  COVID x1  last meal @ 6:30 pm on  29 yr old  @ 38.5 wks, r/o labor  schedule rpt  x 3, 2020    @ 11:31  CAt 1 tracing, periods of indeterminate baseline  TOCO: ctx none  BP: 143/83 (single episode)  Discussed with Dr. Basia NAYAK labs    @ 0200  Admit  GBS positive in urine  CAT 2 tracing as per Dr. Flor  TOCO: ctx none  Pre-op for repeat  x 3  Admission labs  COVID x1  last meal @ 6:30 pm on

## 2020-07-02 NOTE — PROVIDER CONTACT NOTE (OTHER) - BACKGROUND
patient is s/p RPT C/s from 2553
patient is s/p Rpt C/s @ 3255.  EBL = 500ml  QBL = 330ml  Fluids in=  3000  urine output in OR= 100

## 2020-07-02 NOTE — DISCHARGE NOTE OB - CARE PLAN
Principal Discharge DX:	Previous  section  Goal:	recovery  Assessment and plan of treatment:	recovery

## 2020-07-02 NOTE — OB PROVIDER H&P - NSHPLABSRESULTS_GEN_ALL_CORE
Vital Signs Last 24 Hrs  T(C): 37.3 (2020 20:49), Max: 37.4 (2020 20:26)  T(F): 99.1 (2020 20:49), Max: 99.3 (2020 20:26)  HR: 116 (2020 23:27) (105 - 127)  BP: 143/82 (2020 23:19) (125/70 - 143/82)  BP(mean): --  RR: 16 (2020 20:49) (16 - 17)  SpO2: 100% (2020 23:27) (92% - 100%)                            11.6   13.88 )-----------( 377      ( 2020 23:50 )             37.6         137  |  104  |  7   ----------------------------<  85  3.7   |  18<L>  |  0.47<L>    Ca    9.2      2020 23:50    TPro  6.8  /  Alb  3.4  /  TBili  0.5  /  DBili  x   /  AST  14  /  ALT  9   /  AlkPhos  291<H>            Urinalysis Basic - ( 2020 00:23 )    Color: YELLOW / Appearance: Lt TURBID / S.036 / pH: 6.0  Gluc: NEGATIVE / Ketone: MODERATE  / Bili: NEGATIVE / Urobili: TRACE   Blood: NEGATIVE / Protein: 100 / Nitrite: NEGATIVE   Leuk Esterase: SMALL / RBC: 3-5 / WBC 11-25   Sq Epi: MODERATE / Non Sq Epi: x / Bacteria: SMALL      PT/INR - ( 2020 23:50 )   PT: 11.3 SEC;   INR: 0.98          PTT - ( 2020 23:50 )  PTT:30.3 SEC    CAPILLARY BLOOD GLUCOSE     creatinine    Protein, Random Urine: 57.0 mg/dL (20 @ 00:23)    P/c ratio

## 2020-07-02 NOTE — DISCHARGE NOTE OB - CARE PROVIDER_API CALL
Braulio Bruce)  Obstetrics and Gynecology  [address] [place] [address]  Phone: [phone]  Fax: [phone]  Follow Up Time:

## 2020-07-02 NOTE — CHART NOTE - NSCHARTNOTEFT_GEN_A_CORE
R1 Evaluation Note    S: Called to evaluate patient due to continued decreased urine output. Patient denies lightheadedness, dizziness, chest pain, headache, SOB, or increased vaginal bleeding. Patient has received 1500cc worth of fluids. UOP initially increased but has declined again.    O:  HR in 100-110s, /60, afebrile  Gen NAD  Abdomen: fundus firm, no tenderness with palpation  Vaginal exam: pad not soaked with blood    UOP/hr 35-45-45-25    A/P: 28 y/o  s/p rLTCS/BTL with 330cc EBL with decreased urine output. Initially increased after 2 500 L boluses, but has now dropped again.   - administer 500 cc bolus + continuous fluids  - orthostatics  - continue to monitor UOP, if continues to decline consider drawing CBC    Madhuri Beyer PGY1

## 2020-07-02 NOTE — OB RN DELIVERY SUMMARY - NS_SEPSISRSKCALC_OBGYN_ALL_OB_FT
EOS calculated successfully. EOS Risk Factor: 0.5/1000 live births ([facility] national incidence); GA=38w6d; Temp=99.1; ROM=0; GBS='Positive'; Antibiotics='No antibiotics or any antibiotics < 2 hrs prior to birth'

## 2020-07-02 NOTE — DISCHARGE NOTE OB - MEDICATION SUMMARY - MEDICATIONS TO TAKE
I will START or STAY ON the medications listed below when I get home from the hospital:    acetaminophen 325 mg oral tablet  -- 3 tab(s) by mouth every 6 hours, As Needed  -- Indication: For  section    Prena1 oral capsule  -- 1 cap(s) by mouth once a day  -- Indication: For  section I will START or STAY ON the medications listed below when I get home from the hospital:    acetaminophen 325 mg oral tablet  -- 3 tab(s) by mouth every 6 hours, As Needed  -- Indication: For Pain, as needed    Prena1 oral capsule  -- 1 cap(s) by mouth once a day  -- Indication: For supplement

## 2020-07-02 NOTE — DISCHARGE NOTE OB - PATIENT PORTAL LINK FT
You can access the FollowMyHealth Patient Portal offered by [facility] by registering at the following website: [email or web address]. By joining [facility]’s FollowMyHealth portal, you will also be able to view your health information using other applications (apps) compatible with our system.

## 2020-07-02 NOTE — PROVIDER CONTACT NOTE (OTHER) - ASSESSMENT
Patient states she feels a little dizzy to Dr. Wylie   Urine for 1300 hour is 35ml
Patient VSS   Urine at 12pm was 30ml

## 2020-07-02 NOTE — OB PROVIDER DELIVERY SUMMARY - NSPROVIDERDELIVERYNOTE_OBGYN_ALL_OB_FT
female infant, apgars 7/9, wt -- , esua breech presentation, anterior placenta  dense adhesions between R anterior uterine wall and anterior abdominal wall.   grossly normal uterus, tubes and ovaries b/l  hysterotomy closed with chromic  b/l partial salpingectomy with ligasure, fimbria removed, excellent hemostasis, randi applied  fascia reapprox w/vicryl, subQ closed with plain gut, subcuticular skin closure    330/3000/100

## 2020-07-03 DIAGNOSIS — O13.3 GESTATIONAL [PREGNANCY-INDUCED] HYPERTENSION WITHOUT SIGNIFICANT PROTEINURIA, THIRD TRIMESTER: ICD-10-CM

## 2020-07-03 DIAGNOSIS — Z98.891 HISTORY OF UTERINE SCAR FROM PREVIOUS SURGERY: ICD-10-CM

## 2020-07-03 LAB
BASOPHILS # BLD AUTO: 0.04 K/UL — SIGNIFICANT CHANGE UP (ref 0–0.2)
BASOPHILS NFR BLD AUTO: 0.3 % — SIGNIFICANT CHANGE UP (ref 0–2)
EOSINOPHIL # BLD AUTO: 0.02 K/UL — SIGNIFICANT CHANGE UP (ref 0–0.5)
EOSINOPHIL NFR BLD AUTO: 0.2 % — SIGNIFICANT CHANGE UP (ref 0–6)
HCT VFR BLD CALC: 27.8 % — LOW (ref 34.5–45)
HGB BLD-MCNC: 8.8 G/DL — LOW (ref 11.5–15.5)
IMM GRANULOCYTES NFR BLD AUTO: 0.5 % — SIGNIFICANT CHANGE UP (ref 0–1.5)
LYMPHOCYTES # BLD AUTO: 1.57 K/UL — SIGNIFICANT CHANGE UP (ref 1–3.3)
LYMPHOCYTES # BLD AUTO: 11.9 % — LOW (ref 13–44)
MCHC RBC-ENTMCNC: 22.6 PG — LOW (ref 27–34)
MCHC RBC-ENTMCNC: 31.7 % — LOW (ref 32–36)
MCV RBC AUTO: 71.3 FL — LOW (ref 80–100)
MONOCYTES # BLD AUTO: 0.9 K/UL — SIGNIFICANT CHANGE UP (ref 0–0.9)
MONOCYTES NFR BLD AUTO: 6.8 % — SIGNIFICANT CHANGE UP (ref 2–14)
NEUTROPHILS # BLD AUTO: 10.54 K/UL — HIGH (ref 1.8–7.4)
NEUTROPHILS NFR BLD AUTO: 80.3 % — HIGH (ref 43–77)
NRBC # FLD: 0 K/UL — SIGNIFICANT CHANGE UP (ref 0–0)
PLATELET # BLD AUTO: 296 K/UL — SIGNIFICANT CHANGE UP (ref 150–400)
PMV BLD: 10.2 FL — SIGNIFICANT CHANGE UP (ref 7–13)
RBC # BLD: 3.9 M/UL — SIGNIFICANT CHANGE UP (ref 3.8–5.2)
RBC # FLD: 15.2 % — HIGH (ref 10.3–14.5)
WBC # BLD: 13.14 K/UL — HIGH (ref 3.8–10.5)
WBC # FLD AUTO: 13.14 K/UL — HIGH (ref 3.8–10.5)

## 2020-07-03 RX ORDER — IBUPROFEN 200 MG
600 TABLET ORAL EVERY 6 HOURS
Refills: 0 | Status: DISCONTINUED | OUTPATIENT
Start: 2020-07-03 | End: 2020-07-04

## 2020-07-03 RX ORDER — FERROUS SULFATE 325(65) MG
325 TABLET ORAL THREE TIMES A DAY
Refills: 0 | Status: DISCONTINUED | OUTPATIENT
Start: 2020-07-03 | End: 2020-07-04

## 2020-07-03 RX ORDER — ASCORBIC ACID 60 MG
500 TABLET,CHEWABLE ORAL DAILY
Refills: 0 | Status: DISCONTINUED | OUTPATIENT
Start: 2020-07-03 | End: 2020-07-04

## 2020-07-03 RX ORDER — ACETAMINOPHEN 500 MG
3 TABLET ORAL
Qty: 0 | Refills: 0 | DISCHARGE
Start: 2020-07-03

## 2020-07-03 RX ORDER — SENNA PLUS 8.6 MG/1
1 TABLET ORAL
Refills: 0 | Status: DISCONTINUED | OUTPATIENT
Start: 2020-07-03 | End: 2020-07-04

## 2020-07-03 RX ORDER — FERROUS SULFATE 325(65) MG
325 TABLET ORAL DAILY
Refills: 0 | Status: DISCONTINUED | OUTPATIENT
Start: 2020-07-03 | End: 2020-07-03

## 2020-07-03 RX ADMIN — Medication 30 MILLIGRAM(S): at 06:05

## 2020-07-03 RX ADMIN — Medication 975 MILLIGRAM(S): at 02:44

## 2020-07-03 RX ADMIN — Medication 975 MILLIGRAM(S): at 21:02

## 2020-07-03 RX ADMIN — Medication 1 TABLET(S): at 12:20

## 2020-07-03 RX ADMIN — HEPARIN SODIUM 5000 UNIT(S): 5000 INJECTION INTRAVENOUS; SUBCUTANEOUS at 06:06

## 2020-07-03 RX ADMIN — Medication 325 MILLIGRAM(S): at 14:27

## 2020-07-03 RX ADMIN — Medication 500 MILLIGRAM(S): at 12:20

## 2020-07-03 RX ADMIN — Medication 30 MILLIGRAM(S): at 01:03

## 2020-07-03 RX ADMIN — HEPARIN SODIUM 5000 UNIT(S): 5000 INJECTION INTRAVENOUS; SUBCUTANEOUS at 17:28

## 2020-07-03 RX ADMIN — Medication 325 MILLIGRAM(S): at 21:08

## 2020-07-03 RX ADMIN — Medication 25 MILLIGRAM(S): at 09:26

## 2020-07-03 RX ADMIN — Medication 600 MILLIGRAM(S): at 17:28

## 2020-07-03 NOTE — PROGRESS NOTE ADULT - ASSESSMENT
A/P: 30yo  POD#1 s/p rLTCS with 330cc EBL.  Patient is stable and doing well post-operatively.    - Continue regular diet  - Increase ambulation.  - Continue motrin, tylenol, oxycodone PRN for pain control    Madhuri Beyer, PGY1
Gen: A&O x 3, NAD  Chest: CTA B/L  Cardiac: S1,S2  RRR  Breast: Soft, nontender, nonengorged  Abdomen: +BS; soft; Nontender, nondistended; dressing removed incision C/D/I steri strips in place  Gyn: minimal lochia   Extremities: Nontender, venodynes in place

## 2020-07-03 NOTE — PROGRESS NOTE ADULT - SUBJECTIVE AND OBJECTIVE BOX
NP Provider NOte:    Patient seen at bedside resting comfortably offers no new complaints. not yet ambulating, voiding spontaneously.  + flatus;  no bm; tolerating Regular diet both breast and bottle feeding. Bonding well w/ . Denies HA, blurry vision or epigastric pain, CP, SOB, N/V/D,  dizziness, palpitations, worsening vaginal bleeding.    Vital Signs Last 24 Hrs  T(C): 36.6 (2020 05:48), Max: 37.1 (2020 19:30)  T(F): 97.9 (2020 05:48), Max: 98.8 (2020 19:30)  HR: 97 (2020 05:48) (95 - 133)  BP: 123/54 (2020 05:48) (101/53 - 135/73)  BP(mean): 84 (2020 20:00) (61 - 100)  RR: 18 (2020 05:48) (14 - 36)  SpO2: 98% (2020 05:48) (94% - 100%)                            8.8    13.14 )-----------( 296      ( 2020 06:30 )             27.8       A/P: POD #1 s/p c/s 20  -Pain management as needed  -OOB and ambulate  - reviewed AM CBC   - f/u void  - encouraged incentive spiromter  - regular diet  -Encourage breastfeeding   -d/w Linda Decker AGNP-C   [phone] NP Provider NOte:    Patient seen at bedside resting comfortably offers no new complaints. not yet ambulating, voiding spontaneously.  + flatus;  no bm; tolerating Regular diet both breast and bottle feeding. Bonding well w/ . Denies HA, blurry vision or epigastric pain, CP, SOB, N/V/D,  dizziness, palpitations, worsening vaginal bleeding.    Vital Signs Last 24 Hrs  T(C): 36.6 (2020 05:48), Max: 37.1 (2020 19:30)  T(F): 97.9 (2020 05:48), Max: 98.8 (2020 19:30)  HR: 97 (2020 05:48) (95 - 133)  BP: 123/54 (2020 05:48) (101/53 - 135/73)  BP(mean): 84 (2020 20:00) (61 - 100)  RR: 18 (2020 05:48) (14 - 36)  SpO2: 98% (2020 05:48) (94% - 100%)                            8.8    13.14 )-----------( 296      ( 2020 06:30 )             27.8       A/P: POD #1 s/p c/s 20  -Pain management as needed  -OOB and ambulate  - reviewed AM CBC   - f/u void  - encouraged incentive spiromter  - regular diet  -Encourage breastfeeding   - to given strict PEC prec, Take BP TID, call office if BP >140/90  - RTO 1wk for BP check  -d/w Linda Decker AGNP-C   [phone]

## 2020-07-03 NOTE — PROGRESS NOTE ADULT - SUBJECTIVE AND OBJECTIVE BOX
OB Postpartum Note: Repeat  Delivery, POD#1     S: 30yo GP POD#1 s/p rLTCS. Her pain is well controlled. She is tolerating a regular diet but not passing flatus. Reports mild itchiness resolved with lotion. Denies N/V. Denies CP/SOB/lightheadedness/dizziness.    Ambulating without difficulty and voiding freely.     O:   Vitals:  Vital Signs Last 24 Hrs  T(C): 36.6 (2020 05:48), Max: 37.1 (2020 19:30)  T(F): 97.9 (2020 05:48), Max: 98.8 (2020 19:30)  HR: 97 (2020 05:48) (95 - 133)  BP: 123/54 (2020 05:48) (101/53 - 135/73)  BP(mean): 84 (2020 20:00) (61 - 100)  RR: 18 (2020 05:48) (14 - 36)  SpO2: 98% (2020 05:48) (94% - 100%)    PE:  General: NAD  Abdomen: soft, non-tender, incision c/d/i.  Extremities: no erythema    MEDICATIONS  (STANDING):  acetaminophen   Tablet .. 975 milliGRAM(s) Oral <User Schedule>  diphtheria/tetanus/pertussis (acellular) Vaccine (ADAcel) 0.5 milliLiter(s) IntraMuscular once  ferrous    sulfate 325 milliGRAM(s) Oral daily  heparin   Injectable 5000 Unit(s) SubCutaneous every 12 hours  ketorolac   Injectable 30 milliGRAM(s) IV Push every 6 hours  prenatal multivitamin 1 Tablet(s) Oral daily    MEDICATIONS  (PRN):  diphenhydrAMINE 25 milliGRAM(s) Oral every 6 hours PRN Itching  lanolin Ointment 1 Application(s) Topical every 6 hours PRN Sore Nipples  magnesium hydroxide Suspension 30 milliLiter(s) Oral two times a day PRN Constipation  oxyCODONE    IR 5 milliGRAM(s) Oral every 3 hours PRN Moderate to Severe Pain (4-10)  oxyCODONE    IR 5 milliGRAM(s) Oral once PRN Moderate to Severe Pain (4-10)  senna 1 Tablet(s) Oral two times a day PRN Constipation  simethicone 80 milliGRAM(s) Chew every 4 hours PRN Gas      Labs:  Blood type: O Positive  Rubella IgG: RPR: Negative                          8.8<L>   13.14<H> >-----------< 296    (  @ 06:30 )             27.8<L>                        10.1<L>   16.62<H> >-----------< 293    (  @ 14:30 )             32.1<L>                        11.6   13.88<H> >-----------< 377    (  @ 23:50 )             37.6    20 @ 23:50      137  |  104  |  7   ----------------------------<  85  3.7   |  18<L>  |  0.47<L>        Ca    9.2      2020 23:50    TPro  6.8  /  Alb  3.4  /  TBili  0.5  /  DBili  x   /  AST  14  /  ALT  9   /  AlkPhos  291<H>  20 @ 23:50 OB Postpartum Note: Repeat  Delivery, POD#1     S: 28yo GP POD#1 s/p rLTCS. Her pain is well controlled. She is tolerating a regular diet but not passing flatus. Reports mild itchiness resolved with lotion. Denies N/V. Denies CP/SOB/lightheadedness/dizziness.    Ambulating without difficulty and voiding freely.     O:   Vitals:  Vital Signs Last 24 Hrs  T(C): 36.6 (2020 05:48), Max: 37.1 (2020 19:30)  T(F): 97.9 (2020 05:48), Max: 98.8 (2020 19:30)  HR: 97 (2020 05:48) (95 - 133)  BP: 123/54 (2020 05:48) (101/53 - 135/73)  BP(mean): 84 (2020 20:00) (61 - 100)  RR: 18 (2020 05:48) (14 - 36)  SpO2: 98% (2020 05:48) (94% - 100%)    PE:  General: NAD  Abdomen: soft, non-tender, Pfannenstiel incision c/d/i (bandage removed)  Extremities: no erythema or edema    MEDICATIONS  (STANDING):  acetaminophen   Tablet .. 975 milliGRAM(s) Oral <User Schedule>  diphtheria/tetanus/pertussis (acellular) Vaccine (ADAcel) 0.5 milliLiter(s) IntraMuscular once  ferrous    sulfate 325 milliGRAM(s) Oral daily  heparin   Injectable 5000 Unit(s) SubCutaneous every 12 hours  ketorolac   Injectable 30 milliGRAM(s) IV Push every 6 hours  prenatal multivitamin 1 Tablet(s) Oral daily    MEDICATIONS  (PRN):  diphenhydrAMINE 25 milliGRAM(s) Oral every 6 hours PRN Itching  lanolin Ointment 1 Application(s) Topical every 6 hours PRN Sore Nipples  magnesium hydroxide Suspension 30 milliLiter(s) Oral two times a day PRN Constipation  oxyCODONE    IR 5 milliGRAM(s) Oral every 3 hours PRN Moderate to Severe Pain (4-10)  oxyCODONE    IR 5 milliGRAM(s) Oral once PRN Moderate to Severe Pain (4-10)  senna 1 Tablet(s) Oral two times a day PRN Constipation  simethicone 80 milliGRAM(s) Chew every 4 hours PRN Gas      Labs:  Blood type: O Positive  Rubella IgG: RPR: Negative                          8.8<L>   13.14<H> >-----------< 296    (  @ 06:30 )             27.8<L>                        10.1<L>   16.62<H> >-----------< 293    (  @ 14:30 )             32.1<L>                        11.6   13.88<H> >-----------< 377    (  @ 23:50 )             37.6    20 @ 23:50      137  |  104  |  7   ----------------------------<  85  3.7   |  18<L>  |  0.47<L>        Ca    9.2      2020 23:50    TPro  6.8  /  Alb  3.4  /  TBili  0.5  /  DBili  x   /  AST  14  /  ALT  9   /  AlkPhos  291<H>  20 @ 23:50

## 2020-07-03 NOTE — LACTATION INITIAL EVALUATION - LACTATION INTERVENTIONS
assisted to put baby to left breast in football hold position with deep latch and baby noted to suck vigorously

## 2020-07-03 NOTE — LACTATION INITIAL EVALUATION - INTERVENTION OUTCOME
good return demonstration/verbalizes understanding/pt. requested father of baby as ; discussed benefits of breastfeeding, wet and soiled diaper log, feeding cues;  encouraged to ask for assistance as needed;  requested and was given manual breast pump/demonstrates understanding of teaching

## 2020-07-04 VITALS
SYSTOLIC BLOOD PRESSURE: 107 MMHG | HEART RATE: 91 BPM | OXYGEN SATURATION: 100 % | TEMPERATURE: 97 F | DIASTOLIC BLOOD PRESSURE: 59 MMHG | RESPIRATION RATE: 16 BRPM

## 2020-07-04 RX ADMIN — Medication 325 MILLIGRAM(S): at 05:46

## 2020-07-04 RX ADMIN — Medication 600 MILLIGRAM(S): at 12:10

## 2020-07-04 RX ADMIN — HEPARIN SODIUM 5000 UNIT(S): 5000 INJECTION INTRAVENOUS; SUBCUTANEOUS at 05:46

## 2020-07-04 RX ADMIN — Medication 1 TABLET(S): at 12:09

## 2020-07-04 RX ADMIN — Medication 600 MILLIGRAM(S): at 06:13

## 2020-07-04 RX ADMIN — Medication 975 MILLIGRAM(S): at 09:54

## 2020-07-04 RX ADMIN — Medication 600 MILLIGRAM(S): at 00:55

## 2020-07-04 RX ADMIN — Medication 325 MILLIGRAM(S): at 15:00

## 2020-07-04 RX ADMIN — Medication 500 MILLIGRAM(S): at 12:09

## 2020-07-04 RX ADMIN — Medication 975 MILLIGRAM(S): at 15:00

## 2020-07-04 RX ADMIN — Medication 600 MILLIGRAM(S): at 12:09

## 2020-07-04 NOTE — PROGRESS NOTE ADULT - SUBJECTIVE AND OBJECTIVE BOX
OB Progress Note:  repeat TLCS, POD#2    S: 30yo  POD#2 s/p repeat LTCS.   Pain is well controlled. She is tolerating a regular diet and passing flatus. She is voiding spontaneously, and ambulating without difficulty. Denies CP/SOB. Denies lightheadedness/dizziness. Denies N/V.    PAST MEDICAL & SURGICAL HISTORY:  Spontaneous : x 1  Previous  section: c/s 2008 m 6-8 failure to decend  2012 repeat f 8-9 pec in OR-no meds  	    O:  Vitals:  Vital Signs Last 24 Hrs  T(C): 36.6 (2020 17:09), Max: 36.6 (2020 17:09)  T(F): 97.8 (2020 17:09), Max: 97.8 (2020 17:09)  HR: 100 (2020 17:09) (99 - 100)  BP: 124/63 (2020 17:09) (103/59 - 124/63)  BP(mean): --  RR: 18 (2020 17:09) (18 - 19)  SpO2: 100% (2020 17:09) (100% - 100%)    MEDICATIONS  (STANDING):  acetaminophen   Tablet .. 975 milliGRAM(s) Oral <User Schedule>  ascorbic acid 500 milliGRAM(s) Oral daily  diphtheria/tetanus/pertussis (acellular) Vaccine (ADAcel) 0.5 milliLiter(s) IntraMuscular once  ferrous    sulfate 325 milliGRAM(s) Oral three times a day  heparin   Injectable 5000 Unit(s) SubCutaneous every 12 hours  ibuprofen  Tablet. 600 milliGRAM(s) Oral every 6 hours  prenatal multivitamin 1 Tablet(s) Oral daily      MEDICATIONS  (PRN):  diphenhydrAMINE 25 milliGRAM(s) Oral every 6 hours PRN Itching  lanolin Ointment 1 Application(s) Topical every 6 hours PRN Sore Nipples  magnesium hydroxide Suspension 30 milliLiter(s) Oral two times a day PRN Constipation  oxyCODONE    IR 5 milliGRAM(s) Oral every 3 hours PRN Moderate to Severe Pain (4-10)  oxyCODONE    IR 5 milliGRAM(s) Oral once PRN Moderate to Severe Pain (4-10)  senna 1 Tablet(s) Oral two times a day PRN Constipation  simethicone 80 milliGRAM(s) Chew every 4 hours PRN Gas      Labs:  Blood type: O Positive  Rubella IgG: RPR: Negative                          8.8<L>   13.14<H> >-----------< 296    (  @ 06:30 )             27.8<L>                        10.1<L>   16.62<H> >-----------< 293    (  @ 14:30 )             32.1<L>                        11.6   13.88<H> >-----------< 377    (  @ 23:50 )             37.6    20 @ 23:50      137  |  104  |  7   ----------------------------<  85  3.7   |  18<L>  |  0.47<L>        Ca    9.2      2020 23:50    TPro  6.8  /  Alb  3.4  /  TBili  0.5  /  DBili  x   /  AST  14  /  ALT  9   /  AlkPhos  291<H>  20 @ 23:50          PE:  General: NAD  Abdomen: Soft, nondistended, nontender  Fundus: firm, appropriately tender, incision site benign, steri strips in place c/d/i.  Lochia: scant, nl  Extremities: Trace bilateral equal edema; no erythema, no calf tenderness, homans sign negative x 2, pedal pulses + bilaterally       A/P: 30yo  POD#2 s/p repeat LTCS.  Patient is stable and doing well post-operatively.    - Continue regular diet.  - Increase ambulation.  - Continue motrin, tylenol, oxycodone PRN for pain control. OB Progress Note:  repeat TLCS, POD#2  Patient assessed at 0538  	  S: 30yo  POD#2 s/p repeat LTCS.   Pain is well controlled. She is tolerating a regular diet and passing flatus. She is voiding spontaneously, and ambulating without difficulty. Denies CP/SOB. Denies lightheadedness/dizziness. Denies N/V.    PAST MEDICAL & SURGICAL HISTORY:  Spontaneous : x 1  Previous  section: c/s 2008 m 6-8 failure to decend   repeat f 8-9 pec in OR-no meds  	    O:  Vitals:  Vital Signs Last 24 Hrs  T(C): 36.6 (2020 17:09), Max: 36.6 (2020 17:09)  T(F): 97.8 (2020 17:09), Max: 97.8 (2020 17:09)  HR: 100 (2020 17:09) (99 - 100)  BP: 124/63 (2020 17:09) (103/59 - 124/63)  BP(mean): --  RR: 18 (2020 17:09) (18 - 19)  SpO2: 100% (2020 17:09) (100% - 100%)    MEDICATIONS  (STANDING):  acetaminophen   Tablet .. 975 milliGRAM(s) Oral <User Schedule>  ascorbic acid 500 milliGRAM(s) Oral daily  diphtheria/tetanus/pertussis (acellular) Vaccine (ADAcel) 0.5 milliLiter(s) IntraMuscular once  ferrous    sulfate 325 milliGRAM(s) Oral three times a day  heparin   Injectable 5000 Unit(s) SubCutaneous every 12 hours  ibuprofen  Tablet. 600 milliGRAM(s) Oral every 6 hours  prenatal multivitamin 1 Tablet(s) Oral daily      MEDICATIONS  (PRN):  diphenhydrAMINE 25 milliGRAM(s) Oral every 6 hours PRN Itching  lanolin Ointment 1 Application(s) Topical every 6 hours PRN Sore Nipples  magnesium hydroxide Suspension 30 milliLiter(s) Oral two times a day PRN Constipation  oxyCODONE    IR 5 milliGRAM(s) Oral every 3 hours PRN Moderate to Severe Pain (4-10)  oxyCODONE    IR 5 milliGRAM(s) Oral once PRN Moderate to Severe Pain (4-10)  senna 1 Tablet(s) Oral two times a day PRN Constipation  simethicone 80 milliGRAM(s) Chew every 4 hours PRN Gas      Labs:  Blood type: O Positive  Rubella IgG: RPR: Negative                          8.8<L>   13.14<H> >-----------< 296    (  @ 06:30 )             27.8<L>                        10.1<L>   16.62<H> >-----------< 293    (  @ 14:30 )             32.1<L>                        11.6   13.88<H> >-----------< 377    (  @ 23:50 )             37.6    20 @ 23:50      137  |  104  |  7   ----------------------------<  85  3.7   |  18<L>  |  0.47<L>        Ca    9.2      2020 23:50    TPro  6.8  /  Alb  3.4  /  TBili  0.5  /  DBili  x   /  AST  14  /  ALT  9   /  AlkPhos  291<H>  20 @ 23:50          PE:  General: NAD  Abdomen: Soft, nondistended, nontender  Fundus: firm, appropriately tender, incision site benign, steri strips in place c/d/i.  Lochia: scant, nl  Extremities: Trace bilateral equal edema; no erythema, no calf tenderness, homans sign negative x 2, pedal pulses + bilaterally       A/P: 30yo  POD#2 s/p repeat LTCS.  Patient is stable and doing well post-operatively.    - Continue regular diet.  - Increase ambulation.  - Continue motrin, tylenol, oxycodone PRN for pain control. OB Progress Note:  repeat TLCS, POD#2  Patient assessed at 0538  	  S: 28yo  POD#2 s/p repeat LTCS, c/b gHTN; BP's WNL postpartum	.   Pain is well controlled. She is tolerating a regular diet and passing flatus. She is voiding spontaneously, and ambulating without difficulty. Denies CP/SOB. Denies lightheadedness/dizziness. Denies N/V.    PAST MEDICAL & SURGICAL HISTORY:  Spontaneous : x 1  Previous  section: c/s  m 6-8 failure to decend   repeat f 8-9 pec in OR-no meds  	    O:  Vitals:  Vital Signs Last 24 Hrs  T(C): 36.6 (2020 17:09), Max: 36.6 (2020 17:09)  T(F): 97.8 (2020 17:09), Max: 97.8 (2020 17:09)  HR: 100 (2020 17:09) (99 - 100)  BP: 124/63 (2020 17:09) (103/59 - 124/63)  BP(mean): --  RR: 18 (2020 17:09) (18 - 19)  SpO2: 100% (2020 17:09) (100% - 100%)    MEDICATIONS  (STANDING):  acetaminophen   Tablet .. 975 milliGRAM(s) Oral <User Schedule>  ascorbic acid 500 milliGRAM(s) Oral daily  diphtheria/tetanus/pertussis (acellular) Vaccine (ADAcel) 0.5 milliLiter(s) IntraMuscular once  ferrous    sulfate 325 milliGRAM(s) Oral three times a day  heparin   Injectable 5000 Unit(s) SubCutaneous every 12 hours  ibuprofen  Tablet. 600 milliGRAM(s) Oral every 6 hours  prenatal multivitamin 1 Tablet(s) Oral daily      MEDICATIONS  (PRN):  diphenhydrAMINE 25 milliGRAM(s) Oral every 6 hours PRN Itching  lanolin Ointment 1 Application(s) Topical every 6 hours PRN Sore Nipples  magnesium hydroxide Suspension 30 milliLiter(s) Oral two times a day PRN Constipation  oxyCODONE    IR 5 milliGRAM(s) Oral every 3 hours PRN Moderate to Severe Pain (4-10)  oxyCODONE    IR 5 milliGRAM(s) Oral once PRN Moderate to Severe Pain (4-10)  senna 1 Tablet(s) Oral two times a day PRN Constipation  simethicone 80 milliGRAM(s) Chew every 4 hours PRN Gas      Labs:  Blood type: O Positive  Rubella IgG: RPR: Negative                          8.8<L>   13.14<H> >-----------< 296    (  @ 06:30 )             27.8<L>                        10.1<L>   16.62<H> >-----------< 293    (  @ 14:30 )             32.1<L>                        11.6   13.88<H> >-----------< 377    (  @ 23:50 )             37.6    20 @ 23:50      137  |  104  |  7   ----------------------------<  85  3.7   |  18<L>  |  0.47<L>        Ca    9.2      2020 23:50    TPro  6.8  /  Alb  3.4  /  TBili  0.5  /  DBili  x   /  AST  14  /  ALT  9   /  AlkPhos  291<H>  20 @ 23:50          PE:  General: NAD  Abdomen: Soft, nondistended, nontender  Fundus: firm, appropriately tender, incision site benign, steri strips in place c/d/i.  Lochia: scant, nl  Extremities: Trace bilateral equal edema; no erythema, no calf tenderness, homans sign negative x 2, pedal pulses + bilaterally       A/P: 28yo  POD#2 s/p repeat LTCS.  Patient is stable and doing well post-operatively.    - Continue regular diet.  - Increase ambulation.  - Continue motrin, tylenol, oxycodone PRN for pain control.   - rx for electronic BP cuff provided, instructed patient on how to use (three times daily, call office if over 140/90, f/u with MD in office in 1 week per NP noted 7/3/20)

## 2020-07-06 ENCOUNTER — TRANSCRIPTION ENCOUNTER (OUTPATIENT)
Age: 30
End: 2020-07-06

## 2020-07-06 DIAGNOSIS — R52 PAIN, UNSPECIFIED: ICD-10-CM

## 2020-07-06 RX ORDER — IBUPROFEN 600 MG/1
600 TABLET, FILM COATED ORAL 3 TIMES DAILY
Refills: 0 | Status: ACTIVE | COMMUNITY
Start: 2020-07-06

## 2020-07-06 RX ORDER — PNV NO.95/FERROUS FUM/FOLIC AC 28MG-0.8MG
TABLET ORAL DAILY
Refills: 0 | Status: ACTIVE | COMMUNITY
Start: 2020-07-06

## 2020-07-20 LAB — SURGICAL PATHOLOGY STUDY: SIGNIFICANT CHANGE UP

## 2021-04-06 ENCOUNTER — TRANSCRIPTION ENCOUNTER (OUTPATIENT)
Age: 31
End: 2021-04-06

## 2021-04-23 ENCOUNTER — TRANSCRIPTION ENCOUNTER (OUTPATIENT)
Age: 31
End: 2021-04-23

## 2021-11-22 NOTE — OB RN INTRAOPERATIVE NOTE - NSINITIALFHASSES_OBGYN_ALL_OB_FT
Received request via: Pharmacy    Was the patient seen in the last year in this department? Yes    Does the patient have an active prescription (recently filled or refills available) for medication(s) requested? No     145

## 2022-03-14 ENCOUNTER — EMERGENCY (EMERGENCY)
Facility: HOSPITAL | Age: 32
LOS: 1 days | Discharge: HOME | End: 2022-03-14
Attending: EMERGENCY MEDICINE
Payer: COMMERCIAL

## 2022-03-14 VITALS
DIASTOLIC BLOOD PRESSURE: 66 MMHG | OXYGEN SATURATION: 98 % | SYSTOLIC BLOOD PRESSURE: 106 MMHG | HEIGHT: 63 IN | TEMPERATURE: 100 F | HEART RATE: 127 BPM | WEIGHT: 136.69 LBS | RESPIRATION RATE: 18 BRPM

## 2022-03-14 VITALS
TEMPERATURE: 97 F | HEART RATE: 103 BPM | RESPIRATION RATE: 18 BRPM | DIASTOLIC BLOOD PRESSURE: 63 MMHG | SYSTOLIC BLOOD PRESSURE: 102 MMHG | OXYGEN SATURATION: 99 %

## 2022-03-14 DIAGNOSIS — Z98.891 HISTORY OF UTERINE SCAR FROM PREVIOUS SURGERY: Chronic | ICD-10-CM

## 2022-03-14 LAB
ALBUMIN SERPL ELPH-MCNC: 3.3 G/DL — LOW (ref 3.5–5)
ALP SERPL-CCNC: 131 U/L — HIGH (ref 40–120)
ALT FLD-CCNC: 25 U/L DA — SIGNIFICANT CHANGE UP (ref 10–60)
ANION GAP SERPL CALC-SCNC: 8 MMOL/L — SIGNIFICANT CHANGE UP (ref 5–17)
APPEARANCE UR: CLEAR — SIGNIFICANT CHANGE UP
AST SERPL-CCNC: 17 U/L — SIGNIFICANT CHANGE UP (ref 10–40)
BACTERIA # UR AUTO: ABNORMAL /HPF
BASOPHILS # BLD AUTO: 0.04 K/UL — SIGNIFICANT CHANGE UP (ref 0–0.2)
BASOPHILS NFR BLD AUTO: 0.6 % — SIGNIFICANT CHANGE UP (ref 0–2)
BILIRUB SERPL-MCNC: 0.8 MG/DL — SIGNIFICANT CHANGE UP (ref 0.2–1.2)
BILIRUB UR-MCNC: NEGATIVE — SIGNIFICANT CHANGE UP
BUN SERPL-MCNC: 9 MG/DL — SIGNIFICANT CHANGE UP (ref 7–18)
CALCIUM SERPL-MCNC: 8.7 MG/DL — SIGNIFICANT CHANGE UP (ref 8.4–10.5)
CHLORIDE SERPL-SCNC: 111 MMOL/L — HIGH (ref 96–108)
CO2 SERPL-SCNC: 19 MMOL/L — LOW (ref 22–31)
COLOR SPEC: YELLOW — SIGNIFICANT CHANGE UP
CREAT SERPL-MCNC: 0.55 MG/DL — SIGNIFICANT CHANGE UP (ref 0.5–1.3)
DIFF PNL FLD: NEGATIVE — SIGNIFICANT CHANGE UP
EGFR: 125 ML/MIN/1.73M2 — SIGNIFICANT CHANGE UP
EOSINOPHIL # BLD AUTO: 0.01 K/UL — SIGNIFICANT CHANGE UP (ref 0–0.5)
EOSINOPHIL NFR BLD AUTO: 0.1 % — SIGNIFICANT CHANGE UP (ref 0–6)
EPI CELLS # UR: ABNORMAL /HPF
GLUCOSE SERPL-MCNC: 101 MG/DL — HIGH (ref 70–99)
GLUCOSE UR QL: NEGATIVE — SIGNIFICANT CHANGE UP
HCG SERPL-ACNC: <1 MIU/ML — SIGNIFICANT CHANGE UP
HCT VFR BLD CALC: 38.5 % — SIGNIFICANT CHANGE UP (ref 34.5–45)
HGB BLD-MCNC: 12.8 G/DL — SIGNIFICANT CHANGE UP (ref 11.5–15.5)
IMM GRANULOCYTES NFR BLD AUTO: 0.3 % — SIGNIFICANT CHANGE UP (ref 0–1.5)
KETONES UR-MCNC: ABNORMAL
LACTATE SERPL-SCNC: 0.9 MMOL/L — SIGNIFICANT CHANGE UP (ref 0.7–2)
LEUKOCYTE ESTERASE UR-ACNC: NEGATIVE — SIGNIFICANT CHANGE UP
LIDOCAIN IGE QN: 41 U/L — LOW (ref 73–393)
LYMPHOCYTES # BLD AUTO: 0.26 K/UL — LOW (ref 1–3.3)
LYMPHOCYTES # BLD AUTO: 3.7 % — LOW (ref 13–44)
MANUAL SMEAR VERIFICATION: SIGNIFICANT CHANGE UP
MCHC RBC-ENTMCNC: 27.1 PG — SIGNIFICANT CHANGE UP (ref 27–34)
MCHC RBC-ENTMCNC: 33.2 GM/DL — SIGNIFICANT CHANGE UP (ref 32–36)
MCV RBC AUTO: 81.4 FL — SIGNIFICANT CHANGE UP (ref 80–100)
MONOCYTES # BLD AUTO: 0.44 K/UL — SIGNIFICANT CHANGE UP (ref 0–0.9)
MONOCYTES NFR BLD AUTO: 6.3 % — SIGNIFICANT CHANGE UP (ref 2–14)
NEUTROPHILS # BLD AUTO: 6.18 K/UL — SIGNIFICANT CHANGE UP (ref 1.8–7.4)
NEUTROPHILS NFR BLD AUTO: 89 % — HIGH (ref 43–77)
NITRITE UR-MCNC: POSITIVE
NRBC # BLD: 0 /100 WBCS — SIGNIFICANT CHANGE UP (ref 0–0)
PH UR: 5 — SIGNIFICANT CHANGE UP (ref 5–8)
PLAT MORPH BLD: NORMAL — SIGNIFICANT CHANGE UP
PLATELET # BLD AUTO: 272 K/UL — SIGNIFICANT CHANGE UP (ref 150–400)
POTASSIUM SERPL-MCNC: 3.6 MMOL/L — SIGNIFICANT CHANGE UP (ref 3.5–5.3)
POTASSIUM SERPL-SCNC: 3.6 MMOL/L — SIGNIFICANT CHANGE UP (ref 3.5–5.3)
PROT SERPL-MCNC: 7.2 G/DL — SIGNIFICANT CHANGE UP (ref 6–8.3)
PROT UR-MCNC: 15
RBC # BLD: 4.73 M/UL — SIGNIFICANT CHANGE UP (ref 3.8–5.2)
RBC # FLD: 13.6 % — SIGNIFICANT CHANGE UP (ref 10.3–14.5)
RBC BLD AUTO: NORMAL — SIGNIFICANT CHANGE UP
RBC CASTS # UR COMP ASSIST: SIGNIFICANT CHANGE UP /HPF (ref 0–2)
SODIUM SERPL-SCNC: 138 MMOL/L — SIGNIFICANT CHANGE UP (ref 135–145)
SP GR SPEC: 1.02 — SIGNIFICANT CHANGE UP (ref 1.01–1.02)
UROBILINOGEN FLD QL: NEGATIVE — SIGNIFICANT CHANGE UP
WBC # BLD: 6.95 K/UL — SIGNIFICANT CHANGE UP (ref 3.8–10.5)
WBC # FLD AUTO: 6.95 K/UL — SIGNIFICANT CHANGE UP (ref 3.8–10.5)
WBC UR QL: SIGNIFICANT CHANGE UP /HPF (ref 0–5)

## 2022-03-14 PROCEDURE — 83605 ASSAY OF LACTIC ACID: CPT

## 2022-03-14 PROCEDURE — 99284 EMERGENCY DEPT VISIT MOD MDM: CPT

## 2022-03-14 PROCEDURE — 96375 TX/PRO/DX INJ NEW DRUG ADDON: CPT

## 2022-03-14 PROCEDURE — 99284 EMERGENCY DEPT VISIT MOD MDM: CPT | Mod: 25

## 2022-03-14 PROCEDURE — 87186 SC STD MICRODIL/AGAR DIL: CPT

## 2022-03-14 PROCEDURE — 96374 THER/PROPH/DIAG INJ IV PUSH: CPT

## 2022-03-14 PROCEDURE — 83690 ASSAY OF LIPASE: CPT

## 2022-03-14 PROCEDURE — 87086 URINE CULTURE/COLONY COUNT: CPT

## 2022-03-14 PROCEDURE — 85025 COMPLETE CBC W/AUTO DIFF WBC: CPT

## 2022-03-14 PROCEDURE — 80053 COMPREHEN METABOLIC PANEL: CPT

## 2022-03-14 PROCEDURE — 84702 CHORIONIC GONADOTROPIN TEST: CPT

## 2022-03-14 PROCEDURE — 36415 COLL VENOUS BLD VENIPUNCTURE: CPT

## 2022-03-14 PROCEDURE — 81001 URINALYSIS AUTO W/SCOPE: CPT

## 2022-03-14 RX ORDER — CEPHALEXIN 500 MG
1 CAPSULE ORAL
Qty: 9 | Refills: 0
Start: 2022-03-14 | End: 2022-03-16

## 2022-03-14 RX ORDER — SODIUM CHLORIDE 9 MG/ML
1000 INJECTION INTRAMUSCULAR; INTRAVENOUS; SUBCUTANEOUS ONCE
Refills: 0 | Status: COMPLETED | OUTPATIENT
Start: 2022-03-14 | End: 2022-03-14

## 2022-03-14 RX ORDER — ACETAMINOPHEN 500 MG
975 TABLET ORAL ONCE
Refills: 0 | Status: COMPLETED | OUTPATIENT
Start: 2022-03-14 | End: 2022-03-14

## 2022-03-14 RX ORDER — FAMOTIDINE 10 MG/ML
20 INJECTION INTRAVENOUS ONCE
Refills: 0 | Status: COMPLETED | OUTPATIENT
Start: 2022-03-14 | End: 2022-03-14

## 2022-03-14 RX ORDER — CEFTRIAXONE 500 MG/1
1000 INJECTION, POWDER, FOR SOLUTION INTRAMUSCULAR; INTRAVENOUS ONCE
Refills: 0 | Status: COMPLETED | OUTPATIENT
Start: 2022-03-14 | End: 2022-03-14

## 2022-03-14 RX ORDER — LOPERAMIDE HCL 2 MG
1 TABLET ORAL
Qty: 1 | Refills: 0
Start: 2022-03-14 | End: 2022-03-14

## 2022-03-14 RX ADMIN — FAMOTIDINE 20 MILLIGRAM(S): 10 INJECTION INTRAVENOUS at 19:14

## 2022-03-14 RX ADMIN — Medication 975 MILLIGRAM(S): at 21:23

## 2022-03-14 RX ADMIN — SODIUM CHLORIDE 1000 MILLILITER(S): 9 INJECTION INTRAMUSCULAR; INTRAVENOUS; SUBCUTANEOUS at 22:18

## 2022-03-14 RX ADMIN — Medication 30 MILLILITER(S): at 19:14

## 2022-03-14 RX ADMIN — SODIUM CHLORIDE 1000 MILLILITER(S): 9 INJECTION INTRAMUSCULAR; INTRAVENOUS; SUBCUTANEOUS at 19:13

## 2022-03-14 RX ADMIN — CEFTRIAXONE 100 MILLIGRAM(S): 500 INJECTION, POWDER, FOR SOLUTION INTRAMUSCULAR; INTRAVENOUS at 21:23

## 2022-03-14 NOTE — ED PROVIDER NOTE - NSICDXPASTSURGICALHX_GEN_ALL_CORE_FT
PAST SURGICAL HISTORY:  Previous  section c/s  m 6-8 failure to decend  2012 repeat f 8-9 pec in OR-no meds

## 2022-03-14 NOTE — ED PROVIDER NOTE - PROGRESS NOTE DETAILS
[facility]su: work up shows uti. Rocephin given. pt mild tension headache - tylenol ordered. informed Rn to rpt vs prior to dc. pt aware of dc  dx gastroenteritis and uti. maalox, keflex and imodium for symptoms. slowly advance your diet. should last about 24-48 hrs. return if worsens.

## 2022-03-14 NOTE — ED PROVIDER NOTE - OBJECTIVE STATEMENT
Treatment Plan Tracking    #1 Treatment Plan not completed within required time limits due to: Client did not schedule an appointment within 15 days of initial assessment            Signatures   Electronically signed by : Roge Friend LCSW; Oct 26 2016  2:48PM EST                       (Author) 30 y/o female with PMHx tubal ligation, bariatric surgery presents to ED c/o abdominal cramps. Patient states abdominal cramping on/off with associated nausea and multiple bought' s black, watery diarrhea since last night. Patient reports is on iron pills. Patient notes ate tacos and pineapple juice but no one else with similar symptoms. Patient was at [facility] and received blood work with no results and GI cocktail, told viral gastroenteritis. Patient still feeling weak and dizzy with mild dysuria and mild headache. Patient denies fever, vaginal discharge or visual loss. No other known complaints. NKDA.

## 2022-03-14 NOTE — ED PROVIDER NOTE - CLINICAL SUMMARY MEDICAL DECISION MAKING FREE TEXT BOX
32 y/o female with viral gastroenteritis, should resolve in x24/48 hours. Will get basic blood work, GI cocktail, check urine and reassess.

## 2022-03-14 NOTE — ED PROVIDER NOTE - DR. NAME
How Severe Are Your Spot(S)?: mild
Have Your Spot(S) Been Treated In The Past?: has not been treated
Hpi Title: Evaluation of Skin Lesions
Derek Hsu Y (DO)

## 2022-03-14 NOTE — ED PROVIDER NOTE - NSFOLLOWUPINSTRUCTIONS_ED_ALL_ED_FT
dx gastroenteritis and uti. maalox, keflex and imodium for symptoms. slowly advance your diet. should last about 24-48 hrs. return if worsens.     dx gastroenteritis y uti. maalox, keflex e imodium para los síntomas. avanza lentamente en tu dieta. debe durar alrededor de 24-48 horas. volver si empeora.

## 2022-03-14 NOTE — ED PROVIDER NOTE - PATIENT PORTAL LINK FT
You can access the FollowMyHealth Patient Portal offered by [facility] by registering at the following website: [email or web address]. By joining [facility]’s FollowMyHealth portal, you will also be able to view your health information using other applications (apps) compatible with our system.

## 2022-08-16 NOTE — OB NEONATOLOGY/PEDIATRICIAN DELIVERY SUMMARY - BABY A: APGAR 1 MIN MUSCLE TONE, DELIVERY
[FreeTextEntry1] : The patient will be referred for chest x-ray.  She was advised to seek hematology evaluation. (1) flexion of extremities

## 2022-10-15 ENCOUNTER — NON-APPOINTMENT (OUTPATIENT)
Age: 32
End: 2022-10-15

## 2023-02-22 ENCOUNTER — EMERGENCY (EMERGENCY)
Facility: HOSPITAL | Age: 33
LOS: 1 days | Discharge: HOME | End: 2023-02-22
Attending: STUDENT IN AN ORGANIZED HEALTH CARE EDUCATION/TRAINING PROGRAM
Payer: COMMERCIAL

## 2023-02-22 VITALS
RESPIRATION RATE: 16 BRPM | HEART RATE: 104 BPM | WEIGHT: 130.07 LBS | HEIGHT: 63 IN | OXYGEN SATURATION: 100 % | TEMPERATURE: 98 F | DIASTOLIC BLOOD PRESSURE: 64 MMHG | SYSTOLIC BLOOD PRESSURE: 108 MMHG

## 2023-02-22 DIAGNOSIS — Z98.891 HISTORY OF UTERINE SCAR FROM PREVIOUS SURGERY: Chronic | ICD-10-CM

## 2023-02-22 LAB
ALBUMIN SERPL ELPH-MCNC: 3.5 G/DL — SIGNIFICANT CHANGE UP (ref 3.5–5)
ALP SERPL-CCNC: 128 U/L — HIGH (ref 40–120)
ALT FLD-CCNC: 21 U/L DA — SIGNIFICANT CHANGE UP (ref 10–60)
ANION GAP SERPL CALC-SCNC: 5 MMOL/L — SIGNIFICANT CHANGE UP (ref 5–17)
APPEARANCE UR: CLEAR — SIGNIFICANT CHANGE UP
AST SERPL-CCNC: 14 U/L — SIGNIFICANT CHANGE UP (ref 10–40)
BACTERIA # UR AUTO: ABNORMAL /HPF
BASOPHILS # BLD AUTO: 0.06 K/UL — SIGNIFICANT CHANGE UP (ref 0–0.2)
BASOPHILS NFR BLD AUTO: 0.7 % — SIGNIFICANT CHANGE UP (ref 0–2)
BILIRUB SERPL-MCNC: 0.5 MG/DL — SIGNIFICANT CHANGE UP (ref 0.2–1.2)
BILIRUB UR-MCNC: NEGATIVE — SIGNIFICANT CHANGE UP
BUN SERPL-MCNC: 11 MG/DL — SIGNIFICANT CHANGE UP (ref 7–18)
CALCIUM SERPL-MCNC: 8.4 MG/DL — SIGNIFICANT CHANGE UP (ref 8.4–10.5)
CHLORIDE SERPL-SCNC: 110 MMOL/L — HIGH (ref 96–108)
CO2 SERPL-SCNC: 25 MMOL/L — SIGNIFICANT CHANGE UP (ref 22–31)
COLOR SPEC: YELLOW — SIGNIFICANT CHANGE UP
COMMENT - URINE: SIGNIFICANT CHANGE UP
CREAT SERPL-MCNC: 0.5 MG/DL — SIGNIFICANT CHANGE UP (ref 0.5–1.3)
DIFF PNL FLD: NEGATIVE — SIGNIFICANT CHANGE UP
EGFR: 127 ML/MIN/1.73M2 — SIGNIFICANT CHANGE UP
EOSINOPHIL # BLD AUTO: 0.2 K/UL — SIGNIFICANT CHANGE UP (ref 0–0.5)
EOSINOPHIL NFR BLD AUTO: 2.4 % — SIGNIFICANT CHANGE UP (ref 0–6)
EPI CELLS # UR: SIGNIFICANT CHANGE UP /HPF
FLUAV AG NPH QL: SIGNIFICANT CHANGE UP
FLUBV AG NPH QL: SIGNIFICANT CHANGE UP
GLUCOSE SERPL-MCNC: 90 MG/DL — SIGNIFICANT CHANGE UP (ref 70–99)
GLUCOSE UR QL: NEGATIVE — SIGNIFICANT CHANGE UP
HCG SERPL-ACNC: <1 MIU/ML — SIGNIFICANT CHANGE UP
HCT VFR BLD CALC: 32.2 % — LOW (ref 34.5–45)
HGB BLD-MCNC: 10.1 G/DL — LOW (ref 11.5–15.5)
IMM GRANULOCYTES NFR BLD AUTO: 0.1 % — SIGNIFICANT CHANGE UP (ref 0–0.9)
KETONES UR-MCNC: NEGATIVE — SIGNIFICANT CHANGE UP
LACTATE SERPL-SCNC: 1.1 MMOL/L — SIGNIFICANT CHANGE UP (ref 0.7–2)
LEUKOCYTE ESTERASE UR-ACNC: NEGATIVE — SIGNIFICANT CHANGE UP
LIDOCAIN IGE QN: 264 U/L — SIGNIFICANT CHANGE UP (ref 73–393)
LYMPHOCYTES # BLD AUTO: 2.33 K/UL — SIGNIFICANT CHANGE UP (ref 1–3.3)
LYMPHOCYTES # BLD AUTO: 27.6 % — SIGNIFICANT CHANGE UP (ref 13–44)
MCHC RBC-ENTMCNC: 23.7 PG — LOW (ref 27–34)
MCHC RBC-ENTMCNC: 31.4 GM/DL — LOW (ref 32–36)
MCV RBC AUTO: 75.4 FL — LOW (ref 80–100)
MONOCYTES # BLD AUTO: 0.63 K/UL — SIGNIFICANT CHANGE UP (ref 0–0.9)
MONOCYTES NFR BLD AUTO: 7.5 % — SIGNIFICANT CHANGE UP (ref 2–14)
NEUTROPHILS # BLD AUTO: 5.2 K/UL — SIGNIFICANT CHANGE UP (ref 1.8–7.4)
NEUTROPHILS NFR BLD AUTO: 61.7 % — SIGNIFICANT CHANGE UP (ref 43–77)
NITRITE UR-MCNC: NEGATIVE — SIGNIFICANT CHANGE UP
NRBC # BLD: 0 /100 WBCS — SIGNIFICANT CHANGE UP (ref 0–0)
PH UR: 5 — SIGNIFICANT CHANGE UP (ref 5–8)
PLATELET # BLD AUTO: 394 K/UL — SIGNIFICANT CHANGE UP (ref 150–400)
POTASSIUM SERPL-MCNC: 3.6 MMOL/L — SIGNIFICANT CHANGE UP (ref 3.5–5.3)
POTASSIUM SERPL-SCNC: 3.6 MMOL/L — SIGNIFICANT CHANGE UP (ref 3.5–5.3)
PROT SERPL-MCNC: 7.5 G/DL — SIGNIFICANT CHANGE UP (ref 6–8.3)
PROT UR-MCNC: 15
RBC # BLD: 4.27 M/UL — SIGNIFICANT CHANGE UP (ref 3.8–5.2)
RBC # FLD: 14.6 % — HIGH (ref 10.3–14.5)
RBC CASTS # UR COMP ASSIST: NEGATIVE /HPF — SIGNIFICANT CHANGE UP (ref 0–2)
SARS-COV-2 RNA SPEC QL NAA+PROBE: SIGNIFICANT CHANGE UP
SODIUM SERPL-SCNC: 140 MMOL/L — SIGNIFICANT CHANGE UP (ref 135–145)
SP GR SPEC: 1.02 — SIGNIFICANT CHANGE UP (ref 1.01–1.02)
UROBILINOGEN FLD QL: NEGATIVE — SIGNIFICANT CHANGE UP
WBC # BLD: 8.43 K/UL — SIGNIFICANT CHANGE UP (ref 3.8–10.5)
WBC # FLD AUTO: 8.43 K/UL — SIGNIFICANT CHANGE UP (ref 3.8–10.5)
WBC UR QL: SIGNIFICANT CHANGE UP /HPF (ref 0–5)

## 2023-02-22 PROCEDURE — 99284 EMERGENCY DEPT VISIT MOD MDM: CPT

## 2023-02-22 RX ORDER — ACETAMINOPHEN 500 MG
1000 TABLET ORAL ONCE
Refills: 0 | Status: COMPLETED | OUTPATIENT
Start: 2023-02-22 | End: 2023-02-22

## 2023-02-22 RX ORDER — SODIUM CHLORIDE 9 MG/ML
3 INJECTION INTRAMUSCULAR; INTRAVENOUS; SUBCUTANEOUS EVERY 8 HOURS
Refills: 0 | Status: DISCONTINUED | OUTPATIENT
Start: 2023-02-22 | End: 2023-02-26

## 2023-02-22 RX ORDER — SODIUM CHLORIDE 9 MG/ML
1000 INJECTION INTRAMUSCULAR; INTRAVENOUS; SUBCUTANEOUS ONCE
Refills: 0 | Status: COMPLETED | OUTPATIENT
Start: 2023-02-22 | End: 2023-02-22

## 2023-02-22 RX ADMIN — Medication 400 MILLIGRAM(S): at 22:48

## 2023-02-22 RX ADMIN — Medication 1000 MILLIGRAM(S): at 23:13

## 2023-02-22 RX ADMIN — SODIUM CHLORIDE 1000 MILLILITER(S): 9 INJECTION INTRAMUSCULAR; INTRAVENOUS; SUBCUTANEOUS at 22:46

## 2023-02-22 NOTE — ED PROVIDER NOTE - PHYSICAL EXAMINATION
Vital Signs Reviewed  GEN: Comfortable, NAD, AAOx3  HEENT: NCAT, MMM, Neck Supple  RESP: CTAB, No rales/rhonchi/wheezing  CV: RRR, S1S2, No murmurs  ABD: RUQ TTP w/o guarding, Soft, ND, No masses, No CVA Tenderness  Extrem/Skin: Equal pulses bilat, No cyanosis/edema/rashes  Neuro: No focal deficits

## 2023-02-22 NOTE — ED PROVIDER NOTE - PATIENT PORTAL LINK FT
You can access the FollowMyHealth Patient Portal offered by [facility] by registering at the following website: [email or web address]. By joining [facility]’s FollowMyHealth portal, you will also be able to view your health information using other applications (apps) compatible with our system.

## 2023-02-22 NOTE — ED PROVIDER NOTE - NSFOLLOWUPINSTRUCTIONS_ED_ALL_ED_FT
You were seen in the emergency room today. Please call your primary doctor to inform them of this ER visit and obtain the next available appointment within the next 5 days. As we discussed, return to the ER if you have any worsening symptoms.    We no longer feel that you need further emergency care or admission to the hospital at this time.    While we have determined that you are currently stable for discharge, we know that things can change. Please seek immediate medical attention or return to the ER if you experience any of the following:  Any worsening or persistent symptoms  Severe Pain  Chest Pain  Difficulty Breathing  Bleeding  Passing Out  Severe Rash  Inability to Eat or Drink  Persistent Fever    Please see a primary care doctor or specialist within 5 days to ensure that you are improving.    Please call the [facility] phone numbers on this document if you have any problems obtaining a follow up appointment.    I wish you well! -Dr Cook

## 2023-02-22 NOTE — ED PROVIDER NOTE - CLINICAL SUMMARY MEDICAL DECISION MAKING FREE TEXT BOX
Pt p/w ABD pain with mild tenderness on exam. Labs and imaging pending. Pt stable. Will reassess. Pt p/w ABD pain with mild tenderness on exam. Labs and imaging pending. Pt stable. Will reassess.    Labs and UA unremarkable. CT showing no acute path, 3cm ovarian cyst on R side though pt lacks any R lower ABD pain. On reassessment pt states that her pain has resolved. Rec PMD f/u ASAP. Most likely non emergent etiology of symptoms- the details of the case, history, and exam make more emergent diagnoses much less likely. Discussed with pt my clinical impression and results, patient given strict return precautions if persistent or worsening of symptoms occurs, and need for close follow up. Pt expressed understanding and agrees with plan. Pt is well appearing with a reassuring exam. Discharge home with PMD or Specialist f/u within 5 days.

## 2023-02-23 VITALS
OXYGEN SATURATION: 100 % | DIASTOLIC BLOOD PRESSURE: 70 MMHG | SYSTOLIC BLOOD PRESSURE: 114 MMHG | RESPIRATION RATE: 16 BRPM | HEART RATE: 95 BPM | TEMPERATURE: 98 F

## 2023-02-23 PROBLEM — Z98.51 TUBAL LIGATION STATUS: Chronic | Status: ACTIVE | Noted: 2022-03-14

## 2023-02-23 PROCEDURE — 74177 CT ABD & PELVIS W/CONTRAST: CPT | Mod: 26,MA

## 2023-02-23 PROCEDURE — 96374 THER/PROPH/DIAG INJ IV PUSH: CPT | Mod: XU

## 2023-02-23 PROCEDURE — 80053 COMPREHEN METABOLIC PANEL: CPT

## 2023-02-23 PROCEDURE — 99284 EMERGENCY DEPT VISIT MOD MDM: CPT | Mod: 25

## 2023-02-23 PROCEDURE — 83690 ASSAY OF LIPASE: CPT

## 2023-02-23 PROCEDURE — 36415 COLL VENOUS BLD VENIPUNCTURE: CPT

## 2023-02-23 PROCEDURE — 83605 ASSAY OF LACTIC ACID: CPT

## 2023-02-23 PROCEDURE — 85025 COMPLETE CBC W/AUTO DIFF WBC: CPT

## 2023-02-23 PROCEDURE — 84702 CHORIONIC GONADOTROPIN TEST: CPT

## 2023-02-23 PROCEDURE — 87637 SARSCOV2&INF A&B&RSV AMP PRB: CPT

## 2023-02-23 PROCEDURE — 74177 CT ABD & PELVIS W/CONTRAST: CPT | Mod: MA

## 2023-02-23 PROCEDURE — 81001 URINALYSIS AUTO W/SCOPE: CPT

## 2024-07-08 ENCOUNTER — EMERGENCY (EMERGENCY)
Facility: HOSPITAL | Age: 34
LOS: 1 days | Discharge: HOME | End: 2024-07-08
Attending: STUDENT IN AN ORGANIZED HEALTH CARE EDUCATION/TRAINING PROGRAM
Payer: COMMERCIAL

## 2024-07-08 VITALS
DIASTOLIC BLOOD PRESSURE: 60 MMHG | TEMPERATURE: 99 F | WEIGHT: 134.92 LBS | RESPIRATION RATE: 16 BRPM | SYSTOLIC BLOOD PRESSURE: 97 MMHG | HEIGHT: 63 IN | OXYGEN SATURATION: 98 % | HEART RATE: 81 BPM

## 2024-07-08 DIAGNOSIS — Z98.891 HISTORY OF UTERINE SCAR FROM PREVIOUS SURGERY: Chronic | ICD-10-CM

## 2024-07-08 PROCEDURE — 99285 EMERGENCY DEPT VISIT HI MDM: CPT

## 2024-07-09 VITALS
DIASTOLIC BLOOD PRESSURE: 66 MMHG | SYSTOLIC BLOOD PRESSURE: 104 MMHG | HEART RATE: 99 BPM | RESPIRATION RATE: 17 BRPM | OXYGEN SATURATION: 100 % | TEMPERATURE: 99 F

## 2024-07-09 LAB
ALBUMIN SERPL ELPH-MCNC: 3.6 G/DL — SIGNIFICANT CHANGE UP (ref 3.5–5)
ALP SERPL-CCNC: 105 U/L — SIGNIFICANT CHANGE UP (ref 40–120)
ALT FLD-CCNC: 18 U/L DA — SIGNIFICANT CHANGE UP (ref 10–60)
ANION GAP SERPL CALC-SCNC: 3 MMOL/L — LOW (ref 5–17)
ANISOCYTOSIS BLD QL: SIGNIFICANT CHANGE UP
APPEARANCE UR: ABNORMAL
AST SERPL-CCNC: 22 U/L — SIGNIFICANT CHANGE UP (ref 10–40)
BACTERIA # UR AUTO: ABNORMAL /HPF
BASOPHILS # BLD AUTO: 0.07 K/UL — SIGNIFICANT CHANGE UP (ref 0–0.2)
BASOPHILS NFR BLD AUTO: 1 % — SIGNIFICANT CHANGE UP (ref 0–2)
BILIRUB SERPL-MCNC: 0.7 MG/DL — SIGNIFICANT CHANGE UP (ref 0.2–1.2)
BILIRUB UR-MCNC: NEGATIVE — SIGNIFICANT CHANGE UP
BUN SERPL-MCNC: 10 MG/DL — SIGNIFICANT CHANGE UP (ref 7–18)
CALCIUM SERPL-MCNC: 8.8 MG/DL — SIGNIFICANT CHANGE UP (ref 8.4–10.5)
CHLORIDE SERPL-SCNC: 110 MMOL/L — HIGH (ref 96–108)
CO2 SERPL-SCNC: 25 MMOL/L — SIGNIFICANT CHANGE UP (ref 22–31)
COLOR SPEC: YELLOW — SIGNIFICANT CHANGE UP
CREAT SERPL-MCNC: 0.62 MG/DL — SIGNIFICANT CHANGE UP (ref 0.5–1.3)
DIFF PNL FLD: NEGATIVE — SIGNIFICANT CHANGE UP
EGFR: 120 ML/MIN/1.73M2 — SIGNIFICANT CHANGE UP
EOSINOPHIL # BLD AUTO: 0.14 K/UL — SIGNIFICANT CHANGE UP (ref 0–0.5)
EOSINOPHIL NFR BLD AUTO: 1.9 % — SIGNIFICANT CHANGE UP (ref 0–6)
GLUCOSE SERPL-MCNC: 82 MG/DL — SIGNIFICANT CHANGE UP (ref 70–99)
GLUCOSE UR QL: NEGATIVE MG/DL — SIGNIFICANT CHANGE UP
HCG SERPL-ACNC: <1 MIU/ML — SIGNIFICANT CHANGE UP
HCT VFR BLD CALC: 25.4 % — LOW (ref 34.5–45)
HGB BLD-MCNC: 7.2 G/DL — LOW (ref 11.5–15.5)
IMM GRANULOCYTES NFR BLD AUTO: 0.1 % — SIGNIFICANT CHANGE UP (ref 0–0.9)
KETONES UR-MCNC: ABNORMAL MG/DL
LEUKOCYTE ESTERASE UR-ACNC: ABNORMAL
LIDOCAIN IGE QN: 40 U/L — SIGNIFICANT CHANGE UP (ref 13–75)
LYMPHOCYTES # BLD AUTO: 2.07 K/UL — SIGNIFICANT CHANGE UP (ref 1–3.3)
LYMPHOCYTES # BLD AUTO: 28.3 % — SIGNIFICANT CHANGE UP (ref 13–44)
MACROCYTES BLD QL: SLIGHT — SIGNIFICANT CHANGE UP
MANUAL SMEAR VERIFICATION: SIGNIFICANT CHANGE UP
MCHC RBC-ENTMCNC: 16.7 PG — LOW (ref 27–34)
MCHC RBC-ENTMCNC: 28.3 GM/DL — LOW (ref 32–36)
MCV RBC AUTO: 58.8 FL — LOW (ref 80–100)
MICROCYTES BLD QL: SIGNIFICANT CHANGE UP
MONOCYTES # BLD AUTO: 0.54 K/UL — SIGNIFICANT CHANGE UP (ref 0–0.9)
MONOCYTES NFR BLD AUTO: 7.4 % — SIGNIFICANT CHANGE UP (ref 2–14)
NEUTROPHILS # BLD AUTO: 4.48 K/UL — SIGNIFICANT CHANGE UP (ref 1.8–7.4)
NEUTROPHILS NFR BLD AUTO: 61.3 % — SIGNIFICANT CHANGE UP (ref 43–77)
NITRITE UR-MCNC: POSITIVE
NRBC # BLD: 0 /100 WBCS — SIGNIFICANT CHANGE UP (ref 0–0)
OVALOCYTES BLD QL SMEAR: SLIGHT — SIGNIFICANT CHANGE UP
PH UR: 7 — SIGNIFICANT CHANGE UP (ref 5–8)
PLAT MORPH BLD: NORMAL — SIGNIFICANT CHANGE UP
PLATELET # BLD AUTO: 370 K/UL — SIGNIFICANT CHANGE UP (ref 150–400)
PLATELET COUNT - ESTIMATE: NORMAL — SIGNIFICANT CHANGE UP
POIKILOCYTOSIS BLD QL AUTO: SLIGHT — SIGNIFICANT CHANGE UP
POTASSIUM SERPL-MCNC: 4 MMOL/L — SIGNIFICANT CHANGE UP (ref 3.5–5.3)
POTASSIUM SERPL-SCNC: 4 MMOL/L — SIGNIFICANT CHANGE UP (ref 3.5–5.3)
PROT SERPL-MCNC: 7.5 G/DL — SIGNIFICANT CHANGE UP (ref 6–8.3)
PROT UR-MCNC: NEGATIVE MG/DL — SIGNIFICANT CHANGE UP
RBC # BLD: 4.32 M/UL — SIGNIFICANT CHANGE UP (ref 3.8–5.2)
RBC # FLD: 19.4 % — HIGH (ref 10.3–14.5)
RBC BLD AUTO: ABNORMAL
RBC CASTS # UR COMP ASSIST: 3 /HPF — SIGNIFICANT CHANGE UP (ref 0–4)
SCHISTOCYTES BLD QL AUTO: SLIGHT — SIGNIFICANT CHANGE UP
SODIUM SERPL-SCNC: 138 MMOL/L — SIGNIFICANT CHANGE UP (ref 135–145)
SP GR SPEC: 1.02 — SIGNIFICANT CHANGE UP (ref 1–1.03)
UROBILINOGEN FLD QL: 1 MG/DL — SIGNIFICANT CHANGE UP (ref 0.2–1)
WBC # BLD: 7.31 K/UL — SIGNIFICANT CHANGE UP (ref 3.8–10.5)
WBC # FLD AUTO: 7.31 K/UL — SIGNIFICANT CHANGE UP (ref 3.8–10.5)
WBC UR QL: 12 /HPF — HIGH (ref 0–5)

## 2024-07-09 PROCEDURE — 76856 US EXAM PELVIC COMPLETE: CPT

## 2024-07-09 PROCEDURE — 81001 URINALYSIS AUTO W/SCOPE: CPT

## 2024-07-09 PROCEDURE — 76856 US EXAM PELVIC COMPLETE: CPT | Mod: 26,59

## 2024-07-09 PROCEDURE — 83690 ASSAY OF LIPASE: CPT

## 2024-07-09 PROCEDURE — 76830 TRANSVAGINAL US NON-OB: CPT | Mod: 26

## 2024-07-09 PROCEDURE — 80053 COMPREHEN METABOLIC PANEL: CPT

## 2024-07-09 PROCEDURE — 84702 CHORIONIC GONADOTROPIN TEST: CPT

## 2024-07-09 PROCEDURE — 76830 TRANSVAGINAL US NON-OB: CPT

## 2024-07-09 PROCEDURE — 74177 CT ABD & PELVIS W/CONTRAST: CPT | Mod: 26,MC

## 2024-07-09 PROCEDURE — 85025 COMPLETE CBC W/AUTO DIFF WBC: CPT

## 2024-07-09 PROCEDURE — 74177 CT ABD & PELVIS W/CONTRAST: CPT | Mod: MC

## 2024-07-09 PROCEDURE — 87186 SC STD MICRODIL/AGAR DIL: CPT

## 2024-07-09 PROCEDURE — 36415 COLL VENOUS BLD VENIPUNCTURE: CPT

## 2024-07-09 PROCEDURE — 99284 EMERGENCY DEPT VISIT MOD MDM: CPT | Mod: 25

## 2024-07-09 PROCEDURE — 87086 URINE CULTURE/COLONY COUNT: CPT

## 2024-07-09 PROCEDURE — 96365 THER/PROPH/DIAG IV INF INIT: CPT

## 2024-07-09 RX ORDER — CEFTRIAXONE SODIUM 500 MG
1000 VIAL (EA) INJECTION ONCE
Refills: 0 | Status: COMPLETED | OUTPATIENT
Start: 2024-07-09 | End: 2024-07-09

## 2024-07-09 RX ADMIN — Medication 100 MILLIGRAM(S): at 01:39

## 2024-07-09 RX ADMIN — Medication 1000 MILLIGRAM(S): at 02:00

## 2024-07-10 ENCOUNTER — APPOINTMENT (OUTPATIENT)
Dept: GASTROENTEROLOGY | Facility: CLINIC | Age: 34
End: 2024-07-10

## 2024-07-12 LAB
-  AMOXICILLIN/CLAVULANIC ACID: SIGNIFICANT CHANGE UP
-  AMPICILLIN/SULBACTAM: SIGNIFICANT CHANGE UP
-  AMPICILLIN: SIGNIFICANT CHANGE UP
-  AZTREONAM: SIGNIFICANT CHANGE UP
-  CEFAZOLIN: SIGNIFICANT CHANGE UP
-  CEFEPIME: SIGNIFICANT CHANGE UP
-  CEFOXITIN: SIGNIFICANT CHANGE UP
-  CEFTRIAXONE: SIGNIFICANT CHANGE UP
-  CEFUROXIME: SIGNIFICANT CHANGE UP
-  CIPROFLOXACIN: SIGNIFICANT CHANGE UP
-  ERTAPENEM: SIGNIFICANT CHANGE UP
-  GENTAMICIN: SIGNIFICANT CHANGE UP
-  IMIPENEM: SIGNIFICANT CHANGE UP
-  LEVOFLOXACIN: SIGNIFICANT CHANGE UP
-  MEROPENEM: SIGNIFICANT CHANGE UP
-  NITROFURANTOIN: SIGNIFICANT CHANGE UP
-  PIPERACILLIN/TAZOBACTAM: SIGNIFICANT CHANGE UP
-  TOBRAMYCIN: SIGNIFICANT CHANGE UP
-  TRIMETHOPRIM/SULFAMETHOXAZOLE: SIGNIFICANT CHANGE UP
CULTURE RESULTS: ABNORMAL
METHOD TYPE: SIGNIFICANT CHANGE UP
ORGANISM # SPEC MICROSCOPIC CNT: ABNORMAL
ORGANISM # SPEC MICROSCOPIC CNT: ABNORMAL
SPECIMEN SOURCE: SIGNIFICANT CHANGE UP

## 2024-07-22 PROBLEM — R13.10 DYSPHAGIA, UNSPECIFIED: Chronic | Status: ACTIVE | Noted: 2024-07-09

## 2024-07-23 ENCOUNTER — APPOINTMENT (OUTPATIENT)
Dept: GASTROENTEROLOGY | Facility: CLINIC | Age: 34
End: 2024-07-23
Payer: COMMERCIAL

## 2024-07-23 DIAGNOSIS — R13.10 DYSPHAGIA, UNSPECIFIED: ICD-10-CM

## 2024-07-23 DIAGNOSIS — K22.2 ESOPHAGEAL OBSTRUCTION: ICD-10-CM

## 2024-07-23 PROCEDURE — 99443: CPT

## 2024-07-23 PROCEDURE — 99204 OFFICE O/P NEW MOD 45 MIN: CPT

## 2024-07-23 NOTE — REASON FOR VISIT
[Home] : at home, [unfilled] , at the time of the visit. [Medical Office: ([facility])___] : at the medical office located in  [Patient] : the patient [Self] : self [This encounter was initiated by telehealth (audio with video) and converted to telephone (audio only) due to technical difficulties.] : This encounter was initiated by telehealth (audio with video) and converted to telephone (audio only) due to technical difficulties. [Initial Evaluation] : an initial evaluation

## 2024-07-23 NOTE — ASSESSMENT
[FreeTextEntry1] : 33F with pmhx of recently diagnosed esophageal stricture, obesity s/p gastric sleeve (2021) presenting for evaluation. Had EGD recently for dysphagia symptoms and found to have a severe pinhole stricture at 20 cm from the incisors, unable to traverse, referred for dilation by Dr. Lyons. Had esophagram showed anterior esophageal web. Denies any other complaints, no abd pain, n/v/d/c, melena, hematochezia, fever/chills, jaundice, dark urine, or other issues.  - Schedule EGD w/ fluoro, possible dilation.

## 2024-07-23 NOTE — HISTORY OF PRESENT ILLNESS
[FreeTextEntry1] :   33F with pmhx of recently diagnosed esophageal stricture, obesity s/p gastric sleeve (2021) presenting for evaluation. Had EGD recently for dysphagia symptoms and found to have a severe pinhole stricture at 20 cm from the incisors, unable to traverse, referred for dilation by Dr. Lyons. Had esophagram showed anterior esophageal web. Denies any other complaints, no abd pain, n/v/d/c, melena, hematochezia, fever/chills, jaundice, dark urine, or other issues.   PMHx: Above.  Medications: None.  Allergies: NKDA. Surgical Hx: Gastric sleeve.  SH: Denies tobacco or drug use. Social etoh use, notes increase recently.  FH: Mother with colon cancer.

## 2024-07-23 NOTE — PHYSICAL EXAM

## 2024-08-22 ENCOUNTER — APPOINTMENT (OUTPATIENT)
Dept: GASTROENTEROLOGY | Facility: HOSPITAL | Age: 34
End: 2024-08-22

## 2024-08-22 ENCOUNTER — INPATIENT (INPATIENT)
Facility: HOSPITAL | Age: 34
LOS: 0 days | Discharge: HOME | DRG: 812 | End: 2024-08-23
Attending: STUDENT IN AN ORGANIZED HEALTH CARE EDUCATION/TRAINING PROGRAM | Admitting: STUDENT IN AN ORGANIZED HEALTH CARE EDUCATION/TRAINING PROGRAM
Payer: COMMERCIAL

## 2024-08-22 VITALS
HEIGHT: 63 IN | WEIGHT: 127.21 LBS | DIASTOLIC BLOOD PRESSURE: 64 MMHG | OXYGEN SATURATION: 100 % | RESPIRATION RATE: 20 BRPM | HEART RATE: 98 BPM | TEMPERATURE: 98 F | SYSTOLIC BLOOD PRESSURE: 97 MMHG

## 2024-08-22 DIAGNOSIS — D64.9 ANEMIA, UNSPECIFIED: ICD-10-CM

## 2024-08-22 DIAGNOSIS — Z98.891 HISTORY OF UTERINE SCAR FROM PREVIOUS SURGERY: Chronic | ICD-10-CM

## 2024-08-22 DIAGNOSIS — Z29.9 ENCOUNTER FOR PROPHYLACTIC MEASURES, UNSPECIFIED: ICD-10-CM

## 2024-08-22 DIAGNOSIS — K22.2 ESOPHAGEAL OBSTRUCTION: ICD-10-CM

## 2024-08-22 LAB
ALBUMIN SERPL ELPH-MCNC: 3.3 G/DL — LOW (ref 3.5–5)
ALP SERPL-CCNC: 98 U/L — SIGNIFICANT CHANGE UP (ref 40–120)
ALT FLD-CCNC: 21 U/L DA — SIGNIFICANT CHANGE UP (ref 10–60)
ANION GAP SERPL CALC-SCNC: 7 MMOL/L — SIGNIFICANT CHANGE UP (ref 5–17)
ANISOCYTOSIS BLD QL: SLIGHT — SIGNIFICANT CHANGE UP
AST SERPL-CCNC: 24 U/L — SIGNIFICANT CHANGE UP (ref 10–40)
BASOPHILS # BLD AUTO: 0.04 K/UL — SIGNIFICANT CHANGE UP (ref 0–0.2)
BASOPHILS NFR BLD AUTO: 0.7 % — SIGNIFICANT CHANGE UP (ref 0–2)
BILIRUB SERPL-MCNC: 0.7 MG/DL — SIGNIFICANT CHANGE UP (ref 0.2–1.2)
BLD GP AB SCN SERPL QL: SIGNIFICANT CHANGE UP
BUN SERPL-MCNC: 9 MG/DL — SIGNIFICANT CHANGE UP (ref 7–18)
CALCIUM SERPL-MCNC: 8.5 MG/DL — SIGNIFICANT CHANGE UP (ref 8.4–10.5)
CHLORIDE SERPL-SCNC: 110 MMOL/L — HIGH (ref 96–108)
CO2 SERPL-SCNC: 24 MMOL/L — SIGNIFICANT CHANGE UP (ref 22–31)
CREAT SERPL-MCNC: 0.47 MG/DL — LOW (ref 0.5–1.3)
EGFR: 128 ML/MIN/1.73M2 — SIGNIFICANT CHANGE UP
ELLIPTOCYTES BLD QL SMEAR: SLIGHT — SIGNIFICANT CHANGE UP
EOSINOPHIL # BLD AUTO: 0.06 K/UL — SIGNIFICANT CHANGE UP (ref 0–0.5)
EOSINOPHIL NFR BLD AUTO: 1 % — SIGNIFICANT CHANGE UP (ref 0–6)
GLUCOSE SERPL-MCNC: 96 MG/DL — SIGNIFICANT CHANGE UP (ref 70–99)
HCG SERPL-ACNC: <1 MIU/ML — SIGNIFICANT CHANGE UP
HCT VFR BLD CALC: 24.6 % — LOW (ref 34.5–45)
HCT VFR BLD CALC: 28.8 % — LOW (ref 34.5–45)
HGB BLD-MCNC: 7.1 G/DL — LOW (ref 11.5–15.5)
HGB BLD-MCNC: 8.4 G/DL — LOW (ref 11.5–15.5)
HYPOCHROMIA BLD QL: SIGNIFICANT CHANGE UP
IMM GRANULOCYTES NFR BLD AUTO: 0.2 % — SIGNIFICANT CHANGE UP (ref 0–0.9)
IRON SATN MFR SERPL: 14 UG/DL — LOW (ref 40–150)
IRON SATN MFR SERPL: 4 % — LOW (ref 15–50)
LG PLATELETS BLD QL AUTO: SLIGHT — SIGNIFICANT CHANGE UP
LYMPHOCYTES # BLD AUTO: 1.72 K/UL — SIGNIFICANT CHANGE UP (ref 1–3.3)
LYMPHOCYTES # BLD AUTO: 29.6 % — SIGNIFICANT CHANGE UP (ref 13–44)
MANUAL SMEAR VERIFICATION: SIGNIFICANT CHANGE UP
MCHC RBC-ENTMCNC: 17.1 PG — LOW (ref 27–34)
MCHC RBC-ENTMCNC: 18.2 PG — LOW (ref 27–34)
MCHC RBC-ENTMCNC: 28.9 GM/DL — LOW (ref 32–36)
MCHC RBC-ENTMCNC: 29.2 GM/DL — LOW (ref 32–36)
MCV RBC AUTO: 59.3 FL — LOW (ref 80–100)
MCV RBC AUTO: 62.5 FL — LOW (ref 80–100)
MICROCYTES BLD QL: SIGNIFICANT CHANGE UP
MONOCYTES # BLD AUTO: 0.46 K/UL — SIGNIFICANT CHANGE UP (ref 0–0.9)
MONOCYTES NFR BLD AUTO: 7.9 % — SIGNIFICANT CHANGE UP (ref 2–14)
NEUTROPHILS # BLD AUTO: 3.52 K/UL — SIGNIFICANT CHANGE UP (ref 1.8–7.4)
NEUTROPHILS NFR BLD AUTO: 60.6 % — SIGNIFICANT CHANGE UP (ref 43–77)
NRBC # BLD: 0 /100 WBCS — SIGNIFICANT CHANGE UP (ref 0–0)
NRBC # BLD: 0 /100 WBCS — SIGNIFICANT CHANGE UP (ref 0–0)
OVALOCYTES BLD QL SMEAR: SLIGHT — SIGNIFICANT CHANGE UP
PLAT MORPH BLD: NORMAL — SIGNIFICANT CHANGE UP
PLATELET # BLD AUTO: 315 K/UL — SIGNIFICANT CHANGE UP (ref 150–400)
PLATELET # BLD AUTO: 326 K/UL — SIGNIFICANT CHANGE UP (ref 150–400)
PLATELET COUNT - ESTIMATE: NORMAL — SIGNIFICANT CHANGE UP
POIKILOCYTOSIS BLD QL AUTO: SLIGHT — SIGNIFICANT CHANGE UP
POTASSIUM SERPL-MCNC: 4.1 MMOL/L — SIGNIFICANT CHANGE UP (ref 3.5–5.3)
POTASSIUM SERPL-SCNC: 4.1 MMOL/L — SIGNIFICANT CHANGE UP (ref 3.5–5.3)
PROT SERPL-MCNC: 7.1 G/DL — SIGNIFICANT CHANGE UP (ref 6–8.3)
RBC # BLD: 4.15 M/UL — SIGNIFICANT CHANGE UP (ref 3.8–5.2)
RBC # BLD: 4.61 M/UL — SIGNIFICANT CHANGE UP (ref 3.8–5.2)
RBC # FLD: 19.6 % — HIGH (ref 10.3–14.5)
RBC # FLD: 21.5 % — HIGH (ref 10.3–14.5)
RBC BLD AUTO: ABNORMAL
SODIUM SERPL-SCNC: 141 MMOL/L — SIGNIFICANT CHANGE UP (ref 135–145)
TIBC SERPL-MCNC: 394 UG/DL — SIGNIFICANT CHANGE UP (ref 250–450)
UIBC SERPL-MCNC: 380 UG/DL — HIGH (ref 110–370)
WBC # BLD: 5.81 K/UL — SIGNIFICANT CHANGE UP (ref 3.8–10.5)
WBC # BLD: 6.33 K/UL — SIGNIFICANT CHANGE UP (ref 3.8–10.5)
WBC # FLD AUTO: 5.81 K/UL — SIGNIFICANT CHANGE UP (ref 3.8–10.5)
WBC # FLD AUTO: 6.33 K/UL — SIGNIFICANT CHANGE UP (ref 3.8–10.5)

## 2024-08-22 PROCEDURE — 99223 1ST HOSP IP/OBS HIGH 75: CPT | Mod: GC

## 2024-08-22 PROCEDURE — 99285 EMERGENCY DEPT VISIT HI MDM: CPT

## 2024-08-22 PROCEDURE — 99283 EMERGENCY DEPT VISIT LOW MDM: CPT

## 2024-08-22 RX ORDER — ACETAMINOPHEN 325 MG/1
650 TABLET ORAL EVERY 6 HOURS
Refills: 0 | Status: DISCONTINUED | OUTPATIENT
Start: 2024-08-22 | End: 2024-08-23

## 2024-08-22 NOTE — ED PROVIDER NOTE - OBJECTIVE STATEMENT
33-year-old female presents from endoscopy suite and GI where she had outpatient laboratories showing a hemoglobin of 6.9.  Patient states that she does not have any dark/black stools.  Not currently on any blood thinners.  She does admit to heavy menses, last at the end of July.  Associated with fatigue. 33-year-old female presents from endoscopy suite where she was scheduled to have stricture dilation with GI this morning and  outpatient laboratories showing a hemoglobin of 6.9.  Patient states that she does not have any dark/black stools.  Not currently on any blood thinners.  She does admit to heavy menses, last at the end of July.  Associated with fatigue.  Denies chest pain, shortness of breath or lightheadedness.

## 2024-08-22 NOTE — H&P ADULT - PROBLEM SELECTOR PLAN 1
outpt Hgb 6.9  Hgb in ED 7.1  Anemia with hx of heavy periods  last MP: end of July  no signs of GI bleeding    s/p 2 u PRBC  f/u post transfusion CBC  f/u iron deficiency anemia labs outpt Hgb 6.9, MCV 59 (microcytic anemia)  Hgb in ED 7.1  Anemia with hx of heavy periods and FREIDA  LMP 8/1, lasting for 5 days  no signs of GI bleeding    s/p 2 u PRBC  f/u post transfusion CBC  f/u iron deficiency anemia labs  d/c with PO iron tablets

## 2024-08-22 NOTE — ED PROVIDER NOTE - CLINICAL SUMMARY MEDICAL DECISION MAKING FREE TEXT BOX
33-year-old female who presented from endoscopy suite with low H&H.  Patient states that she has been fatigued.  Denies any dark/black stools.  Has heavy menses.  Hemodynamically stable.  Hemoglobin of 7.1 here.  Ordered 1 U PRBCs.  Discussed with GI who requests admission so that they can do endoscopy for stricture dilation in the morning.  Discussed with medicine and will admit for further evaluation and management.

## 2024-08-22 NOTE — H&P ADULT - ASSESSMENT
Pt is a 33-year-old female, w/ hx of dysphagia, sleeve gastrectomy, GERD, who presents from endoscopy suite where she was scheduled to have stricture dilation with GI this morning and outpatient laboratories showed a hemoglobin of 6.9. Due to her anemia, EGD today was cancelled and pt sent to ED for blood transfusion. In ED, s/p 2 u PRBC tranfusions. Admitted to medicine for anemia. Pending EGD with Dr. Bourgeois 8/23.

## 2024-08-22 NOTE — CONSULT NOTE ADULT - ASSESSMENT
33F with pmhx of sleeve gastrectomy presenting for esophageal stricture dilation. Hx of prior EGD with Dr. Lyons showing pinhole like stricture. Of note, labs checked today showed Hb of 6.9 and outpatient EGD canceled. Pt reports no other complaints. Anemia with hx of heavy periods. No signs of GI bleeding.   - Transfuse, Goal Hb above 7-8 for anesthesia.  - NPO after midnight.  - Tentative plans for EGD w/ dilation + fluoro tomorrow.    Total time spent to complete patient's bedside assessment, physical examination, review medical chart including labs & imaging, discuss medical plan of care with housestaff was more than 50 minutes.

## 2024-08-22 NOTE — ED ADULT NURSE NOTE - DATE/TIME OF ACCEPTANCE
TCx is positive for strep. Called and spoke with mom. Px sent for zmax x 5 days.   
22-Aug-2024 11:56

## 2024-08-22 NOTE — ED ADULT TRIAGE NOTE - PRO INTERPRETER NEED 2
white matter    CHF (congestive heart failure) (HCC)     Clotting disorder (HCC)     plebitis in L leg    COPD (chronic obstructive pulmonary disease) (HCC)     DDD (degenerative disc disease), cervical     Degeneration of cervical intervertebral disc     Diverticulosis 2005    Gout     Gout, unspecified     H/O cardiac catheterization 6/17/15    LMCA: Normal 0% stenosis. LAD: Lesion on 1st diag: Proximal subsection. 80% stenosis. Lesion plaque is ruptured. Bladimir Shelbie LCx: Lesion on 1st Ob Leslie: Mid subsection. 85% stenosis. RCA:Lesion on R PDA: Mid subsection. 85% stenosis. Lesion on R PDA: Distal subsection. 70% stenosis. Lesion on Prox RCA: Mid subsection. 50% stenosis. EF 50%.  H/O echocardiogram 11/09/2016    EF 40-45%. Mild LV hypertrophy normal LV cavity size. Sigmoid interventricular septum without evidence of outflow tract obstruction. Left atrium is moderately dilated (34-39) left atrial volume index of 36 ml/m2. Mild mitral regurg. Diastology cannot be assessed due to A-fib.  H/O echocardiogram 03/09/2018    EF 45-50%. Apical hypokinesis noted. The left atrium is moderately dilated (34-39) with a left atrial volume index of 34ml/m2. Mild to moderate mitral regurg. Mild tricuspid regurg. Moderate diastolic dysfunction.  History of Holter monitoring 3/24/16    Atrial Fibrillation throughout,fairly controlled, HR  bpm 79% of the time. Occasional high ventricular rate episodes and multiple pauses suggestive of tachycardia/bradycardia syndrome  Msximum R-R interval 2.68 seconds.    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    Long term (current) use of anticoagulants 8/31/2015    Movement disorder     Other abnormal glucose 2004    Pacemaker 08/10/2017    Insertion of a biventricular pacemaker/ICD AVN ablation    Phlebitis and thrombophlebitis of lower extremities, unspecified 1961    L leg    Senile osteoporosis 2006    L/S    Symptomatic menopausal or female climacteric states 06/1995    Syncope and collapse     Unspecified hereditary and idiopathic peripheral neuropathy 2012    Unspecified sleep apnea 11/2009         SURGICAL HISTORY       Past Surgical History:   Procedure Laterality Date    BRONCHOSCOPY  6/7/2017    BRONCHOSCOPY BRUSHINGS performed by Hamilton Diaz DO at [place] [address]  6/7/2017    BRONCHOSCOPY/TRANSBRONCHIAL LUNG BIOPSY performed by Hamilton Diaz DO at [place] [address]  6/7/2017    BRONCHOSCOPY FLUOROSCOPY performed by Hamilton Diaz DO at [facility] Right 06/17/2015    CATARACT REMOVAL WITH IMPLANT Right 08/14/2017    DR ROMERO    COLONOSCOPY  1/2005    DIAGNOSTIC CARDIAC CATH LAB PROCEDURE  06/17/15    EYE SURGERY      FRACTURE SURGERY  2004    Distal Radius Ulna    HIP FRACTURE SURGERY Right 04/21/2019    Dr. Maggie Friend- hip pinning    HIP PINNING Right 4/21/2019    HIP PINNING performed by Livia Meckel, MD at [address] [place] [address] Left 6/14/2017    MINI PORT ACCESS LEFT CHEST, X2 SPECIMENS.  performed by Yanni Beltran MD at [facility] 83  2years old    VOLVAR-ULCERATIVE LESION-CAUTERIZED    [place] EGD TRANSORAL BIOPSY SINGLE/MULTIPLE Left 2/13/2018    EGD BIOPSY performed by Cyrus Hamilton MD at [address]       Discharge Medication List as of 1/9/2021 10:51 AM      CONTINUE these medications which have NOT CHANGED    Details   carvedilol (COREG) 6.25 MG tablet Take 1 tablet by mouth twice daily, Disp-180 tablet, R-3Normal      vitamin B-12 (CYANOCOBALAMIN) 500 MCG tablet Take 500 mcg by mouth dailyHistorical Med      levothyroxine (SYNTHROID) 75 MCG tablet Take 1 tablet by mouth daily, Disp-30 tablet,R-5Normal      ranolazine (RANEXA) 500 MG extended release tablet Take 1 tablet by mouth twice daily, Disp-180 tablet, R-3Normal      atorvastatin (LIPITOR) 40 MG tablet TAKE 1 TABLET BY MOUTH NIGHTLY, Disp-90 tablet, R-3Normal      ipratropium (ATROVENT) 0.06 % nasal spray USE 2 SPRAY(S) IN EACH NOSTRIL TWICE DAILY, Disp-3 Bottle, R-3Please consider 90 day supplies to promote better adherenceNormal      aspirin EC 81 MG EC tablet Take 1 tablet by mouth daily, Disp-90 tablet, R-3OTC      acetaminophen (TYLENOL) 325 MG tablet Take 2 tablets by mouth every 4 hours as needed for Pain, Disp-120 tablet, R-3DC to SNF      polyethylene glycol (GLYCOLAX) powder Take 17 g by mouth as needed (for constipation)Historical Med             ALLERGIES     Adhesive tape, Aspercreme [trolamine salicylate], Erythromycin, Erythromycin, Guaifenesin & derivatives, Niacin and related, Niacin and related, Tape [adhesive tape], and Augmentin [amoxicillin-pot clavulanate]    FAMILY HISTORY       Family History   Problem Relation Age of Onset    Heart Disease Mother     Stroke Mother     High Blood Pressure Mother     Cancer Father         lung    Stroke Brother     Heart Disease Maternal Grandmother           SOCIAL HISTORY       Social History     Socioeconomic History    Marital status:      Spouse name: None    Number of children: None    Years of education: None    Highest education level: None   Occupational History    None   Social Needs    Financial resource strain: Not hard at all   [place] insecurity     Worry: Never true     Inability: Never true   [employer] needs     Medical: None     Non-medical: None   Tobacco Use    Smoking status: Never Smoker    Smokeless tobacco: Never Used   Substance and Sexual Activity    Alcohol use: No     Alcohol/week: 0.0 standard drinks    Drug use: No    Sexual activity: None   Lifestyle    Physical activity     Days per week: None     Minutes per session: None    Stress: None   Relationships  Social connections     Talks on phone: None     Gets together: None     Attends Bahai service: None     Active member of club or organization: None     Attends meetings of clubs or organizations: None     Relationship status: None    Intimate partner violence     Fear of current or ex partner: None     Emotionally abused: None     Physically abused: None     Forced sexual activity: None   Other Topics Concern    None   Social History Narrative    ** Merged History Encounter **            SCREENINGS                        PHYSICAL EXAM    (up to 7 for level 4, 8 or more for level 5)     ED Triage Vitals [01/09/21 0852]   BP Temp Temp src Pulse Resp SpO2 Height Weight   (!) 145/78 98.1 °F (36.7 °C) -- 83 12 97 % 5' 5\" (1.651 m) 163 lb (73.9 kg)       Physical Exam  Vitals signs and nursing note reviewed. Constitutional:       Appearance: Normal appearance. HENT:      Head: Normocephalic and atraumatic. Right Ear: Tympanic membrane, ear canal and external ear normal.      Left Ear: Tympanic membrane, ear canal and external ear normal.      Ears:      Comments: Tenderness is noted primary about the left TMJ joint with palpation    No tenderness about the temporal artery on the left  No crepitus     Nose: Nose normal.      Mouth/Throat:      Mouth: Mucous membranes are moist.   Eyes:      Extraocular Movements: Extraocular movements intact. Pupils: Pupils are equal, round, and reactive to light. Neck:      Musculoskeletal: Normal range of motion and neck supple. Cardiovascular:      Rate and Rhythm: Normal rate and regular rhythm. Pulses: Normal pulses. Heart sounds: Normal heart sounds. No murmur. Pulmonary:      Effort: Pulmonary effort is normal.      Breath sounds: Normal breath sounds. Abdominal:      General: Abdomen is flat. Palpations: Abdomen is soft. Skin:     General: Skin is warm and dry. Capillary Refill: Capillary refill takes less than 2 seconds. Neurological:      General: No focal deficit present. Mental Status: She is alert. Sensory: No sensory deficit. Motor: No weakness. Coordination: Coordination normal.      Gait: Gait normal.         DIAGNOSTIC RESULTS     EKG: All EKG's are interpreted by the Emergency Department Physician who either signs or Co-signs this chart in the absence of a cardiologist.      RADIOLOGY:   Non-plain film images such as CT, Ultrasound and MRI are read by the radiologist. Plain radiographic images are visualized and preliminarily interpreted by the emergency physician with the below findings:      Interpretation per the Radiologist below, if available at the time of this note:    No orders to display         ED BEDSIDE ULTRASOUND:   Performed by ED Physician - none    LABS:  Labs Reviewed   BASIC METABOLIC PANEL W/ REFLEX TO MG FOR LOW K - Abnormal; Notable for the following components:       Result Value    Glucose 147 (*)     CREATININE 0.96 (*)     GFR Non- 56 (*)     All other components within normal limits   CBC WITH AUTO DIFFERENTIAL - Abnormal; Notable for the following components:    Seg Neutrophils 72 (*)     Lymphocytes 22 (*)     All other components within normal limits   SEDIMENTATION RATE       All other labs were within normal range or not returned as of this dictation. EMERGENCY DEPARTMENT COURSE and DIFFERENTIAL DIAGNOSIS/MDM:   Vitals:    Vitals:    01/09/21 1030 01/09/21 1045 01/09/21 1100 01/09/21 1115   BP: (!) 149/83 (!) 156/89 (!) 140/86 (!) 145/92   Pulse: 80 80 80 83   Resp: 23 14 13 14   Temp:       SpO2: 97% 95% 96% 96%   Weight:       Height:             MDM  Number of Diagnoses or Management Options  Arthralgia of left temporomandibular joint  Diagnosis management comments: This very pleasant 74-year-old female who presented to the emergency department with history for ongoing left jaw discomfort. Clinical examination consistent with TMJs syndrome.   There is no tenderness about the temporal artery. Sed rate is normal.  Temporal arteritis was consideration. In addition the patient has no concerns of chest discomfort diaphoresis shortness of breath or symptoms suggestive of ACS. Patient will be treated symptomatically she is advised to follow-up with her primary care physician as documented invited to return to emergency department in the interim with any concerns whatsoever. 26-year-old very pleasant female who presents to the emergency department with concerns of discomfort primary about her left ear she points to the left TMJ joint. Discomfort is exactly reproducible with palpation left TMJ joint there is no palpable click. The oropharynx is dentition is fair (patient relates that she was most recently at her dentist and had a good checkup)--1 point in time the patient was vague concerning her discomfort in her left TMJ joint region prompting EKG no acute changes, basic laboratory studies have been requested. REASSESSMENT   Patient states that she had less discomfort and felt better after receiving the Toradol (15 mg IM)  ED Course as of Jan 12 0946   Sat Jan 09, 2021   7377 Performed at 909-ventricular rate is 80-consistent with ventricular paced rhythm-QRS 0.142-QTc corrected 0.486-similar compared to previous EKG dated April 20, 2019    [RS]   Tue Jan 12, 2021   3128 CBC Auto Differential(!):    WBC 5.9   RBC 4.99   Hemoglobin Quant 14.6   Hematocrit 46.0   MCV 92.2   MCH 29.3   MCHC 31.7   RDW 13.2   Platelet Count 833   MPV 9.9   NRBC Automated 0.0   Differential Type NOT REPORTED   Seg Neutrophils 72(!)   Lymphocytes 22(!)   Monocytes 3   Eosinophils % 2   Basophils 1   Immature Granulocytes 0   Segs Absolute 4.26   Absolute Lymph # 1.27   Absolute Mono # 0.20   Absolute Eos # 0.11   Basophils Absolute 0.03   Absolute Immature Granulocyte <0.03   WBC Morphology NOT REPO. .. [RS]   8044 Basic Metabolic Panel w/ Reflex to MG(!):    Glucose 147(!)   BUN 13   Creatinine 0.96(!)   Bun/Cre Ratio 14   Calcium 9.2   Sodium 135   Potassium 4.3   Chloride 100   CO2 24   Anion Gap 11   GFR Non- 56(!)   GFR  >60   GFR Comment        GFR Staging      [RS]   0944 Sedimentation Rate:    Sed Rate 12 [RS]      ED Course User Index  [RS] Dorothea Rodriguez MD         CRITICAL CARE TIME   Total Critical Care time was minutes, excluding separately reportable procedures. There was a high probability of clinically significant/life threatening deterioration in the patient's condition which required my urgent intervention. CONSULTS:  None    PROCEDURES:  Unless otherwise noted below, none     Procedures    FINAL IMPRESSION      1. Arthralgia of left temporomandibular joint          DISPOSITION/PLAN   DISPOSITION Decision To Discharge 01/09/2021 10:48:34 AM      PATIENT REFERRED TO:  MD Tracey Nolan [facility] [address]     Call in 2 days        DISCHARGE MEDICATIONS:  Discharge Medication List as of 1/9/2021 10:51 AM        Controlled Substances Monitoring:     No flowsheet data found.     (Please note that portions of this note were completed with a voice recognition program.  Efforts were made to edit the dictations but occasionally words are mis-transcribed.)    Dorothea Rodriguez MD (electronically signed)  Attending Emergency Physician            Dorothea Rodriguez MD  01/12/21 [id number] [place]

## 2024-08-22 NOTE — ED PROVIDER NOTE - PHYSICAL EXAMINATION
General: Patient well appearing, vital signs within normal limits  HEENT: MMM, trachea midline  Eyes: No conjunctival pallor  Respiratory: No respiratory distress  Neuro: Moves all extremities  Skin: No rashes or lesions General: Patient well appearing, vital signs within normal limits  HEENT: MMM, trachea midline  Eyes: No conjunctival pallor  Respiratory: No respiratory distress  GI: Soft, nontender  Neuro: Moves all extremities  Skin: No rashes or lesions

## 2024-08-22 NOTE — ED ADULT TRIAGE NOTE - WEIGHT METHOD
How Severe Are Your Spot(S)?: mild What Type Of Note Output Would You Prefer (Optional)?: Standard Output What Is The Reason For Today's Visit?: Full Body Skin Examination What Is The Reason For Today's Visit? (Being Monitored For X): concerning skin lesions on an annual basis actual

## 2024-08-22 NOTE — H&P ADULT - PROBLEM SELECTOR PLAN 2
hx of esophogeal stricture, pending outpt EGD for dilation today  Hx of prior EGD with Dr. Lyons showing pinhole like stricture  prcedure stopped due to anemia Hgb 6.9  GI Dr. Bourgeois following    post transfusions Hgb goal 7-8 for anesthesia  EGD w/ dilation + fluoro tentatively scheduled for 8/23  NPO after midnight hx of esophogeal stricture, pending outpt EGD for dilation today  Hx of prior EGD with Dr. Lyons showing pinhole like stricture  procedure stopped due to anemia Hgb 6.9  GI Dr. Bourgeois following    post transfusions Hgb goal 7-8 for anesthesia  f/u post transfusion CBC at 9 pm  EGD w/ dilation + fluoro tentatively scheduled for 8/23  NPO after midnight

## 2024-08-22 NOTE — CONSULT NOTE ADULT - SUBJECTIVE AND OBJECTIVE BOX
CC: For EGD dilation.     HPI:  33F with pmhx of sleeve gastrectomy presenting for esophageal stricture dilation. Hx of prior EGD with Dr. Lyons showing pinhole like stricture. Of note, labs checked today showed Hb of 6.9 and outpatient EGD canceled. Pt reports no other complaints.     PAST MEDICAL & SURGICAL HISTORY:  Spontaneous   x 1      History of tubal ligation      Dysphagia      Previous  section  c/s 2008 m 6-8 failure to decend  2012 repeat f 8-9 pec in OR-no meds          MEDICATIONS:      ALLERGIES:  aspirin (Rash)      SOCIAL HISTORY:   Denies tobacco, etoh, or drug use.     FAMILY HISTORY:  Denies fhx of GI disorders.     REVIEW OF SYSTEMS:  CONSTITUTIONAL: No weakness, fevers or chills.  EYES/ENT: No visual changes;  No vertigo or throat pain.  NECK: No pain or stiffness.  RESPIRATORY: No cough, wheezing, hemoptysis; No shortness of breath.  CARDIOVASCULAR: No chest pain or palpitations.  GASTROINTESTINAL: As per HPI.   GENITOURINARY: No dysuria, frequency or hematuria.  NEUROLOGICAL: No numbness or weakness.  SKIN: No itching, rashes.      PHYSICAL EXAM:  VITAL SIGNS:  T(C): 36.8 (24 @ 11:58), Max: 36.8 (24 @ 11:58)  HR: 98 (24 @ 11:58) (96 - 98)  BP: 97/64 (24 @ 11:58) (97/64 - 110/72)  RR: 20 (24 @ 11:58) (12 - 20)  SpO2: 100% (24 @ 11:58) (98% - 100%)  I/Os:       GENERAL: CINDY, non toxic, comfortable in bed  HEENT: EOMI, no icterus, no tracheal deviation, moist mucus membranes   CARDIO: Regular rate and rhythm, no murmurs, rubs or gallops  LUNGS: No wheezing, rales or rhonchi  ABDOMEN: Soft, non tender, non distended, no rebound or guarding  VASCULAR: Warm and well perfused without peripheral edema and palpable pulses  PSYCH AAO x 3, normal mood and affect   SKIN: warm, dry, intact     LABS:                         7.1    5.81  )-----------( 326      ( 22 Aug 2024 12:28 )             24.6         141  |  110<H>  |  9   ----------------------------<  96  4.1   |  24  |  0.47<L>    Ca    8.5      22 Aug 2024 12:28    TPro  7.1  /  Alb  3.3<L>  /  TBili  0.7  /  DBili  x   /  AST  24  /  ALT  21  /  AlkPhos  98        Urinalysis Basic - ( 22 Aug 2024 12:28 )    Color: x / Appearance: x / SG: x / pH: x  Gluc: 96 mg/dL / Ketone: x  / Bili: x / Urobili: x   Blood: x / Protein: x / Nitrite: x   Leuk Esterase: x / RBC: x / WBC x   Sq Epi: x / Non Sq Epi: x / Bacteria: x        RADIOLOGY & ADDITIONAL TESTS: Reviewed.     Spoke with primary team and/or other consultants Yes [ ]  No [ ]

## 2024-08-22 NOTE — H&P ADULT - HISTORY OF PRESENT ILLNESS
Pt is a 33-year-old female, w/ hx of dysphagia, sleeve gastrectomy in [place],  presents from endoscopy suite where she was scheduled to have stricture dilation with GI this morning and outpatient laboratories showing a hemoglobin of 6.9.  Patient states that she does not have any dark/black stools.  Not currently on any blood thinners.  She does admit to heavy menses, last at the end of July.  Associated with fatigue.  Denies chest pain, shortness of breath or lightheadedness. Admitted to medicine for anemia.     In ED,  s/p 2 u PRBC. Pt is a 33-year-old female, w/ hx of dysphagia, sleeve gastrectomy, GERD, who presents from endoscopy suite where she was scheduled to have stricture dilation with GI this morning and outpatient laboratories showed a hemoglobin of 6.9.  She had previously had an EGD with Dr. Lyons which showed a pinhole like stricture. Due to her anemia, EGD today was cancelled and pt sent to ED for blood transfusion. Pt has no complaints. Patient states that she does not have any dark/black stools or hematochezia.  Not currently on any blood thinners.  She does admit to heavy menses, LMP 8/1, lasting for 5 days, Has been prescribed iron medication before but stopped taking it as she got constipated, last therapy about 1 year ago. regularly sees gynecology. In ED, s/p 2 u PRBC tranfusions. Admitted to medicine for anemia. Pending EGD with Dr. Bourgeois 8/23.

## 2024-08-22 NOTE — H&P ADULT - ATTENDING COMMENTS
33-year-old F with PMHx of sleeve gasterectomy, GERD, sent in to ED from Endoscopy suite for anemia. She initially presented for esophageal stricture dilation. she had an EGD with Dr. Lyons showing pinhole like stricture, requiring the procedure scheduled today. however, labs checked today showed Hgb of 6.9, thus the outpatient EGD canceled. Patient does not report acute complaints. Denies melena, hematochezia. LMP 8/1, lasting for 5 days, menstruations regular but she had heavy menses on day 1. Has been prescribed iron medication before but stopped taking it as she got constipated, last therapy about 1 year ago. regularly sees gynecology.  On exam, patient is AOx3, NAD, cardiopulmonary exams unremarkable, abdomen soft, NT/ND, extremities without edema.   labs reviewed, hgb 6.9->7.1, MCV 59, BMP and LFT unremarkable.    # Microcytic anemia, likely from FREIDA  # Esophageal stricture  # GERD  # s/p sleeve gasterectomy  - Mentzer Index 14.3, suggestive of probable thalassemia, although FREIDA is likely the main cause of the anemia  - send iron panel before the transfusion  - 1pRBC transfusion ordered in the ED, repeat CBC after  - GI scheduling the EGD procedure 8/23  - refer to hematology clinic for anemia monitoring on discharge  - would need iron prescription upon discharge  - DVT ppx: SCDs iso anemia 33-year-old F with PMHx of sleeve gasterectomy, GERD, sent in to ED from Endoscopy suite for anemia. She initially presented for esophageal stricture dilation. she had an EGD with Dr. Lyons showing pinhole like stricture, requiring the procedure scheduled today. however, labs checked today showed Hgb of 6.9, thus the outpatient EGD canceled. Patient does not report acute complaints. Denies melena, hematochezia. LMP 8/1, lasting for 5 days, menstruations regular but she had heavy menses on day 1. Has been prescribed iron medication before but stopped taking it as she got constipated, last therapy about 1 year ago. regularly sees gynecology.  On exam, patient is AOx3, NAD, cardiopulmonary exams unremarkable, abdomen soft, NT/ND, extremities without edema.   labs reviewed, hgb 6.9->7.1, MCV 59, BMP and LFT unremarkable.    # Microcytic anemia, likely from FREIDA  # Esophageal stricture  # GERD  # s/p sleeve gasterectomy  - Mentzer Index 14.3, suggestive of probable thalassemia, although FREIDA is likely the main cause of the anemia  - send iron panel before the transfusion  - pRBC transfusion ordered in the ED, repeat CBC after  - GI scheduling the EGD procedure 8/23  - refer to hematology clinic for anemia monitoring on discharge  - would need iron prescription upon discharge  - DVT ppx: SCDs iso anemia 33-year-old F with PMHx of sleeve gasterectomy, GERD, sent in to ED from Endoscopy suite for anemia. She initially presented for esophageal stricture dilation. she had an EGD with Dr. Lyons showing pinhole like stricture, requiring the procedure scheduled today. however, labs checked today showed Hgb of 6.9, thus the outpatient EGD canceled. Patient does not report acute complaints. Denies melena, hematochezia. LMP 8/1, lasting for 5 days, menstruations regular but she had heavy menses on day 1. Has been prescribed iron medication before but stopped taking it as she got constipated, last therapy about 1 year ago. regularly sees gynecology.  On exam, patient is AOx3, NAD, cardiopulmonary exams unremarkable, abdomen soft, NT/ND, extremities without edema.   labs reviewed, hgb 6.9->7.1, MCV 59, BMP and LFT unremarkable.    # Microcytic anemia, likely from FREIDA  # Esophageal stricture  # GERD  # s/p sleeve gasterectomy  - Mentzer Index 14.3, suggestive of potential thalassemia, although FREIDA is likely the main cause of the anemia  - send iron panel before the transfusion  - pRBC transfusion ordered in the ED, repeat CBC after  - GI scheduling the EGD procedure 8/23  - refer to hematology clinic for anemia monitoring on discharge  - would need iron prescription upon discharge  - DVT ppx: SCDs iso anemia 33-year-old F with PMHx of sleeve gasterectomy, GERD, sent in to ED from Endoscopy suite for anemia. She initially presented for esophageal stricture dilation. she had an EGD with Dr. Lyons showing pinhole like stricture, requiring the procedure scheduled today. however, labs checked today showed Hgb of 6.9, thus the outpatient EGD canceled. Patient does not report acute complaints. Denies melena, hematochezia. LMP 8/1, lasting for 5 days, menstruations regular but she had heavy menses on day 1. Has been prescribed iron medication before but stopped taking it as she got constipated, last therapy about 1 year ago. regularly sees gynecology.  On exam, patient is AOx3, NAD, cardiopulmonary exams unremarkable, abdomen soft, NT/ND, extremities without edema.   labs reviewed, hgb 6.9->7.1, MCV 59, BMP and LFT unremarkable.    # Microcytic anemia, likely from FREIDA  # Esophageal stricture  # GERD  # s/p sleeve gasterectomy  - Mentzer Index 14.3, suggestive of potential thalassemia, although FREIDA is likely the main cause of the anemia  - send iron panel before the transfusion  - pRBC transfusion ordered in the ED, repeat CBC after  - GI scheduling the EGD procedure 8/23  - refer to hematology clinic for anemia monitoring on discharge  - would need iron prescription upon discharge  - discussed with GI Dr. Bourgeois, diet with regular now, NPO after midnight  - DVT ppx: SCDs iso anemia 33-year-old F with PMHx of sleeve gasterectomy, GERD, sent in to ED from Endoscopy suite for anemia. She initially presented for esophageal stricture dilation. she had an EGD with Dr. Lyons showing pinhole like stricture, requiring the procedure scheduled today. however, labs checked today showed Hgb of 6.9, thus the outpatient EGD canceled. Patient does not report acute complaints. Denies melena, hematochezia. LMP 8/1, lasting for 5 days, menstruations regular but she had heavy menses on day 1. Has been prescribed iron medication before but stopped taking it as she got constipated, last therapy about 1 year ago. regularly sees gynecology.  On exam, patient is AOx3, NAD, cardiopulmonary exams unremarkable, abdomen soft, NT/ND, extremities without edema.   labs reviewed, hgb 6.9->7.1, MCV 59, BMP and LFT unremarkable.    # Microcytic anemia, likely from FREIDA  # Esophageal stricture  # GERD  # s/p sleeve gasterectomy  - Mentzer Index 14.3, suggestive of potential thalassemia, although FREIDA is likely the main cause of the anemia  - send iron panel before the transfusion  - pRBC transfusion ordered in the ED, repeat CBC after  - GI scheduling the EGD procedure 8/23  - refer to hematology clinic for anemia monitoring on discharge   - would need iron prescription upon discharge  - discussed with GI Dr. Bourgeois, diet with regular now, NPO after midnight  - DVT ppx: SCDs iso anemia

## 2024-08-22 NOTE — ED ADULT NURSE NOTE - CAS EDP DISCH DISPOSITION ADMI
[facility]

## 2024-08-22 NOTE — ED ADULT NURSE NOTE - OBJECTIVE STATEMENT
patient sent for abnormal labs outpatient. Denies chest pain, dizziness, headache, nausea and vomiting. no SOB.

## 2024-08-22 NOTE — ED ADULT NURSE NOTE - NSFALLUNIVINTERV_ED_ALL_ED
Bed/Stretcher in lowest position, wheels locked, appropriate side rails in place/Call bell, personal items and telephone in reach/Instruct patient to call for assistance before getting out of bed/chair/stretcher/Non-slip footwear applied when patient is off stretcher/[place] to call system/Physically safe environment - no spills, clutter or unnecessary equipment/Purposeful proactive rounding/Room/bathroom lighting operational, light cord in reach

## 2024-08-22 NOTE — PATIENT PROFILE ADULT - SURGICAL SITE INCISION
[FreeTextEntry1] : advised to stop prednisolone drops\par medication as prescribed, medication education done \par advised to increase fluid intake, rest, and acetaminophen/ibuprofen prn pain/fever\par referred to ent\par follow up in 1 week\par follow up in office sooner if sx persists or worsens\par patient verbalizes understanding and is stable upon d/c\par 
no

## 2024-08-23 ENCOUNTER — TRANSCRIPTION ENCOUNTER (OUTPATIENT)
Age: 34
End: 2024-08-23

## 2024-08-23 VITALS
RESPIRATION RATE: 16 BRPM | DIASTOLIC BLOOD PRESSURE: 72 MMHG | TEMPERATURE: 98 F | OXYGEN SATURATION: 100 % | HEART RATE: 97 BPM | SYSTOLIC BLOOD PRESSURE: 111 MMHG

## 2024-08-23 LAB
ALBUMIN SERPL ELPH-MCNC: 3.4 G/DL — LOW (ref 3.5–5)
ALP SERPL-CCNC: 100 U/L — SIGNIFICANT CHANGE UP (ref 40–120)
ALT FLD-CCNC: 20 U/L DA — SIGNIFICANT CHANGE UP (ref 10–60)
ANION GAP SERPL CALC-SCNC: 8 MMOL/L — SIGNIFICANT CHANGE UP (ref 5–17)
APTT BLD: 32.9 SEC — SIGNIFICANT CHANGE UP (ref 24.5–35.6)
AST SERPL-CCNC: 11 U/L — SIGNIFICANT CHANGE UP (ref 10–40)
BASOPHILS # BLD AUTO: 0.05 K/UL — SIGNIFICANT CHANGE UP (ref 0–0.2)
BASOPHILS NFR BLD AUTO: 1 % — SIGNIFICANT CHANGE UP (ref 0–2)
BILIRUB SERPL-MCNC: 0.9 MG/DL — SIGNIFICANT CHANGE UP (ref 0.2–1.2)
BUN SERPL-MCNC: 9 MG/DL — SIGNIFICANT CHANGE UP (ref 7–18)
CALCIUM SERPL-MCNC: 8.7 MG/DL — SIGNIFICANT CHANGE UP (ref 8.4–10.5)
CHLORIDE SERPL-SCNC: 109 MMOL/L — HIGH (ref 96–108)
CO2 SERPL-SCNC: 25 MMOL/L — SIGNIFICANT CHANGE UP (ref 22–31)
CREAT SERPL-MCNC: 0.5 MG/DL — SIGNIFICANT CHANGE UP (ref 0.5–1.3)
EGFR: 126 ML/MIN/1.73M2 — SIGNIFICANT CHANGE UP
EOSINOPHIL # BLD AUTO: 0.11 K/UL — SIGNIFICANT CHANGE UP (ref 0–0.5)
EOSINOPHIL NFR BLD AUTO: 2.2 % — SIGNIFICANT CHANGE UP (ref 0–6)
FERRITIN SERPL-MCNC: 2 NG/ML — LOW (ref 15–150)
GLUCOSE SERPL-MCNC: 91 MG/DL — SIGNIFICANT CHANGE UP (ref 70–99)
HCT VFR BLD CALC: 28.4 % — LOW (ref 34.5–45)
HGB BLD-MCNC: 8.3 G/DL — LOW (ref 11.5–15.5)
IMM GRANULOCYTES NFR BLD AUTO: 0.2 % — SIGNIFICANT CHANGE UP (ref 0–0.9)
INR BLD: 1.04 RATIO — SIGNIFICANT CHANGE UP (ref 0.85–1.18)
LYMPHOCYTES # BLD AUTO: 1.43 K/UL — SIGNIFICANT CHANGE UP (ref 1–3.3)
LYMPHOCYTES # BLD AUTO: 29.1 % — SIGNIFICANT CHANGE UP (ref 13–44)
MCHC RBC-ENTMCNC: 18.1 PG — LOW (ref 27–34)
MCHC RBC-ENTMCNC: 29.2 GM/DL — LOW (ref 32–36)
MCV RBC AUTO: 61.9 FL — LOW (ref 80–100)
MONOCYTES # BLD AUTO: 0.44 K/UL — SIGNIFICANT CHANGE UP (ref 0–0.9)
MONOCYTES NFR BLD AUTO: 8.9 % — SIGNIFICANT CHANGE UP (ref 2–14)
NEUTROPHILS # BLD AUTO: 2.88 K/UL — SIGNIFICANT CHANGE UP (ref 1.8–7.4)
NEUTROPHILS NFR BLD AUTO: 58.6 % — SIGNIFICANT CHANGE UP (ref 43–77)
NRBC # BLD: 0 /100 WBCS — SIGNIFICANT CHANGE UP (ref 0–0)
PLATELET # BLD AUTO: 304 K/UL — SIGNIFICANT CHANGE UP (ref 150–400)
POTASSIUM SERPL-MCNC: 4 MMOL/L — SIGNIFICANT CHANGE UP (ref 3.5–5.3)
POTASSIUM SERPL-SCNC: 4 MMOL/L — SIGNIFICANT CHANGE UP (ref 3.5–5.3)
PROT SERPL-MCNC: 7.1 G/DL — SIGNIFICANT CHANGE UP (ref 6–8.3)
PROTHROM AB SERPL-ACNC: 11.8 SEC — SIGNIFICANT CHANGE UP (ref 9.5–13)
RBC # BLD: 4.59 M/UL — SIGNIFICANT CHANGE UP (ref 3.8–5.2)
RBC # FLD: 21.2 % — HIGH (ref 10.3–14.5)
SODIUM SERPL-SCNC: 142 MMOL/L — SIGNIFICANT CHANGE UP (ref 135–145)
WBC # BLD: 4.92 K/UL — SIGNIFICANT CHANGE UP (ref 3.8–10.5)
WBC # FLD AUTO: 4.92 K/UL — SIGNIFICANT CHANGE UP (ref 3.8–10.5)

## 2024-08-23 PROCEDURE — C1726: CPT

## 2024-08-23 PROCEDURE — 86850 RBC ANTIBODY SCREEN: CPT

## 2024-08-23 PROCEDURE — 84702 CHORIONIC GONADOTROPIN TEST: CPT

## 2024-08-23 PROCEDURE — 85025 COMPLETE CBC W/AUTO DIFF WBC: CPT

## 2024-08-23 PROCEDURE — 85730 THROMBOPLASTIN TIME PARTIAL: CPT

## 2024-08-23 PROCEDURE — 82728 ASSAY OF FERRITIN: CPT

## 2024-08-23 PROCEDURE — 86900 BLOOD TYPING SEROLOGIC ABO: CPT

## 2024-08-23 PROCEDURE — 43249 ESOPH EGD DILATION <30 MM: CPT

## 2024-08-23 PROCEDURE — 93005 ELECTROCARDIOGRAM TRACING: CPT

## 2024-08-23 PROCEDURE — 88312 SPECIAL STAINS GROUP 1: CPT | Mod: 26

## 2024-08-23 PROCEDURE — 88305 TISSUE EXAM BY PATHOLOGIST: CPT | Mod: 26

## 2024-08-23 PROCEDURE — 85027 COMPLETE CBC AUTOMATED: CPT

## 2024-08-23 PROCEDURE — 86923 COMPATIBILITY TEST ELECTRIC: CPT

## 2024-08-23 PROCEDURE — 83540 ASSAY OF IRON: CPT

## 2024-08-23 PROCEDURE — 86901 BLOOD TYPING SEROLOGIC RH(D): CPT

## 2024-08-23 PROCEDURE — 36430 TRANSFUSION BLD/BLD COMPNT: CPT

## 2024-08-23 PROCEDURE — 88305 TISSUE EXAM BY PATHOLOGIST: CPT

## 2024-08-23 PROCEDURE — 36415 COLL VENOUS BLD VENIPUNCTURE: CPT

## 2024-08-23 PROCEDURE — 85610 PROTHROMBIN TIME: CPT

## 2024-08-23 PROCEDURE — 80053 COMPREHEN METABOLIC PANEL: CPT

## 2024-08-23 PROCEDURE — P9040: CPT

## 2024-08-23 PROCEDURE — C1769: CPT

## 2024-08-23 PROCEDURE — 43239 EGD BIOPSY SINGLE/MULTIPLE: CPT | Mod: 59

## 2024-08-23 PROCEDURE — 99239 HOSP IP/OBS DSCHRG MGMT >30: CPT | Mod: GC

## 2024-08-23 PROCEDURE — 88312 SPECIAL STAINS GROUP 1: CPT

## 2024-08-23 PROCEDURE — 99285 EMERGENCY DEPT VISIT HI MDM: CPT | Mod: 25

## 2024-08-23 PROCEDURE — 83550 IRON BINDING TEST: CPT

## 2024-08-23 DEVICE — CATH BLN CRE 7.5FR 6-8MM: Type: IMPLANTABLE DEVICE | Status: FUNCTIONAL

## 2024-08-23 DEVICE — HYDRA WIRE: Type: IMPLANTABLE DEVICE | Status: FUNCTIONAL

## 2024-08-23 RX ORDER — PANTOPRAZOLE SODIUM 40 MG
1 TABLET, DELAYED RELEASE (ENTERIC COATED) ORAL
Qty: 60 | Refills: 0
Start: 2024-08-23 | End: 2024-09-21

## 2024-08-23 RX ORDER — FERROUS SULFATE 325(65) MG
5 TABLET ORAL
Qty: 150 | Refills: 0
Start: 2024-08-23 | End: 2024-09-21

## 2024-08-23 NOTE — DISCHARGE NOTE PROVIDER - NSDCCPCAREPLAN_GEN_ALL_CORE_FT
PRINCIPAL DISCHARGE DIAGNOSIS  Diagnosis: Symptomatic anemia  Assessment and Plan of Treatment: You have history of iron deficiency anemia. You were diagnosed with symptomatic anemia in the hospital after your Hb was approximately 7 and you had symptoms of lightheadedness and fatigue.  On admission, you were found to have Hb of 7.1. You were transfused with 1 unit of packed red blood cells and your repeat Hb was 8.4. You also received IV iron supplements while in the hospital. You are recommended to eat green leafy vegetables and take folic acid supplements. You were prescribed liquid oral iron supplementation. Please take your iron as directed: ferrous sulfate 300mg liquid daily.  Please follow up with your PCP in a week from discharge for further recommendations.  Please return to the hospital if you experience dizziness, lightheadedness, syncope, severe fatigue, low blood pressures, or signs of GI bleed.      SECONDARY DISCHARGE DIAGNOSES  Diagnosis: Esophageal stricture  Assessment and Plan of Treatment: You came to the hospital with a known esophageal stricture found on previous EGD. Your stricture was again visualized on inpatient EGD, newly dilated to 8mm and biopsies were taken for analysis.  Please limit your diet to clear liquids initially, and advance as tolerated.  You were prescribed pantoprazole 40mg twice per day.  You were advised to follow up for possible repeat EGD esophageal dilation in 2-3 weeks outpatient.  Please return to the hospital if you experience worsening difficulty swallowing, severe pain, difficulty breathing, or bloody vomit.     PRINCIPAL DISCHARGE DIAGNOSIS  Diagnosis: Symptomatic anemia  Assessment and Plan of Treatment: You have history of iron deficiency anemia. You were diagnosed with symptomatic anemia in the hospital after your Hb was approximately 7 and you had symptoms of lightheadedness and fatigue.  On admission, you were found to have Hb of 7.1. You were transfused with 1 unit of packed red blood cells and your repeat Hb was 8.4. You also received IV iron supplements while in the hospital. You are recommended to eat green leafy vegetables and take folic acid supplements. You were prescribed liquid oral iron supplementation. Please take your iron as directed: ferrous sulfate 300mg liquid daily.  Please follow up with your PCP in a week from discharge for further recommendations.  Please return to the hospital if you experience dizziness, lightheadedness, syncope, severe fatigue, low blood pressures, or signs of GI bleed.      SECONDARY DISCHARGE DIAGNOSES  Diagnosis: Esophageal stricture  Assessment and Plan of Treatment: You came to the hospital with a known esophageal stricture found on previous EGD. Your stricture was again visualized on inpatient EGD, newly dilated to 8mm and biopsies were taken for analysis.  Please limit your diet to soft foods today and advance to regular food tomorrow as tolerated.  You were prescribed pantoprazole 40mg twice per day.  You were advised to follow up for possible repeat EGD esophageal dilation in 2-3 weeks outpatient.  Please return to the hospital if you experience worsening difficulty swallowing, severe pain, difficulty breathing, or bloody vomit.     PRINCIPAL DISCHARGE DIAGNOSIS  Diagnosis: Symptomatic anemia  Assessment and Plan of Treatment: You have history of iron deficiency anemia. You were diagnosed with symptomatic anemia in the hospital after your Hb was approximately 7 and you had symptoms of lightheadedness and fatigue.  On admission, you were found to have Hb of 7.1. You were transfused with 1 unit of packed red blood cells and your repeat Hb was 8.4. You also received IV iron supplements while in the hospital. You are recommended to eat green leafy vegetables and take folic acid supplements. You were prescribed liquid oral iron supplementation. Please take your iron as directed: ferrous sulfate 300mg liquid daily.  Please follow up with your PCP in a week from discharge for further recommendations.  Please return to the hospital if you experience dizziness, lightheadedness, syncope, severe fatigue, low blood pressures, or signs of GI bleed.      SECONDARY DISCHARGE DIAGNOSES  Diagnosis: Esophageal stricture  Assessment and Plan of Treatment: You came to the hospital with a known esophageal stricture found on previous EGD. Your stricture was again visualized on inpatient EGD, newly dilated to 8mm and biopsies were taken for analysis.  Please limit your diet to soft foods today and advance to regular food tomorrow as tolerated.  You were prescribed pantoprazole 40mg (10mL liquid) twice per day.  You were advised to follow up for possible repeat EGD esophageal dilation in 2-3 weeks outpatient.  Please return to the hospital if you experience worsening difficulty swallowing, severe pain, difficulty breathing, or bloody vomit.

## 2024-08-23 NOTE — DISCHARGE NOTE PROVIDER - NSDCMRMEDTOKEN_GEN_ALL_CORE_FT
acetaminophen 325 mg oral tablet: 3 tab(s) orally every 6 hours, As Needed  aluminum hydroxide/magnesium hydroxide/simethicone 200 mg-200 mg-20 mg/5 mL oral suspension: 30 milliliter(s) orally 4 times a day (before meals and at bedtime), As Needed indigestion  cephalexin 500 mg oral tablet: 1 tab(s) orally 3 times a day   electronic blood pressure cuff: 1 application transdermal 3 times a day   electronic breast pump: Apply topically to affected area every 2 to 3 hours  Imodium 2 mg oral capsule: 1 cap(s) orally once a day   Prena1 oral capsule: 1 cap(s) orally once a day   acetaminophen 325 mg oral tablet: 3 tab(s) orally every 6 hours, As Needed  aluminum hydroxide/magnesium hydroxide/simethicone 200 mg-200 mg-20 mg/5 mL oral suspension: 30 milliliter(s) orally 4 times a day (before meals and at bedtime), As Needed indigestion  cephalexin 500 mg oral tablet: 1 tab(s) orally 3 times a day   electronic blood pressure cuff: 1 application transdermal 3 times a day   electronic breast pump: Apply topically to affected area every 2 to 3 hours  ferrous sulfate 300 mg/5 mL (60 mg/5 mL elemental iron) oral liquid: 5 milliliter(s) orally once a day  Imodium 2 mg oral capsule: 1 cap(s) orally once a day   pantoprazole 40 mg oral delayed release tablet: 1 tab(s) orally 2 times a day  Prena1 oral capsule: 1 cap(s) orally once a day   ferrous sulfate 300 mg/5 mL (60 mg/5 mL elemental iron) oral liquid: 5 milliliter(s) orally once a day  pantoprazole 4 mg/mL oral suspension: 10 milliliter(s) orally

## 2024-08-23 NOTE — PROGRESS NOTE ADULT - ASSESSMENT
Pt is a 33-year-old female, w/ hx of dysphagia, sleeve gastrectomy, GERD, presenting from endoscopy suite where she was scheduled to have stricture dilation with GI yesterday and outpatient laboratories showed a hemoglobin of 6.9. Due to her anemia, EGD was cancelled and pt sent to ED for blood transfusion. In ED, s/p 1 u PRBC tranfusion. Admitted to medicine for anemia and s/p EGD with stricture dilation.

## 2024-08-23 NOTE — PROGRESS NOTE ADULT - ATTENDING COMMENTS
Dr Cedeño interviewed patient in Telugu. Professional translation services offered.    33W PMH dysphagia, sleeve gastrectomy, GERD admitted for symptomatic anemia.     #Iron deficiency anemia  -s/p 1U pRBC with overcorrection  -labs consistent with FREIDA; possible thalassemia given degree of microcytosis  -no longer has sx of anemia  -follows w/GYN for menorrhagia; no other overt hemorrhage noted  -has difficulty swallowing iron tablets; advised to try liquid PO iron supplements  -should follow w/PMD vs hematology for Hgb electrophoresis as outpatient    #Dysphagia  -undergoing dilatation via EGD with GI, f/u findings

## 2024-08-23 NOTE — DISCHARGE NOTE PROVIDER - ATTENDING DISCHARGE PHYSICAL EXAMINATION:
T(C): 36.9 (08-23-24 @ 17:45), Max: 36.9 (08-23-24 @ 17:45)  HR: 97 (08-23-24 @ 17:45) (78 - 103)  BP: 111/72 (08-23-24 @ 17:45) (97/50 - 116/68)  RR: 16 (08-23-24 @ 17:45) (12 - 20)  SpO2: 100% (08-23-24 @ 17:45) (100% - 100%)    PHYSICAL EXAM:  GENERAL: NAD, lying in bed  HEAD:  Atraumatic, Normocephalic  EYES: EOMI, PERRLA, conjunctiva and sclera clear  NECK: Supple, No elevated JVD  CHEST/LUNG: Clear to auscultation bilaterally; No wheeze  HEART: Regular rate and rhythm; No murmurs, rubs, or gallops  ABDOMEN: Soft, Nontender, Nondistended; Bowel sounds present  EXTREMITIES:  2+ Peripheral Pulses, No clubbing, cyanosis, or edema  PSYCH: AAOx3  NEUROLOGY: CN II-XII grossly intact, moving all extremities  SKIN: No rashes or lesions

## 2024-08-23 NOTE — DISCHARGE NOTE NURSING/CASE MANAGEMENT/SOCIAL WORK - NSDCPEFALRISK_GEN_ALL_CORE
For information on Fall & Injury Prevention, visit: [email or web address] OR  [email or web address] OR  https://www.cdc.gov/steadi/patient.html

## 2024-08-23 NOTE — PROGRESS NOTE ADULT - PROBLEM SELECTOR PLAN 1
outpt Hgb 6.9, MCV 59 (microcytic anemia)  Hgb in ED 7.1  Anemia with hx of heavy periods and FREIDA  LMP 8/1, lasting for 5 days  no signs of GI bleeding    s/p 1 u PRBC  Hgb ut 8.4 (post transfusion)>8.3 (8/23 AM)  iron panel: ferritin 2, iron 14, iron binding capacity 380, % sat 4, TIBC 394  d/c with liquid iron

## 2024-08-23 NOTE — DISCHARGE NOTE NURSING/CASE MANAGEMENT/SOCIAL WORK - PATIENT PORTAL LINK FT
You can access the FollowMyHealth Patient Portal offered by Health system by registering at the following website: [email or web address]. By joining [employer]’s FollowMyHealth portal, you will also be able to view your health information using other applications (apps) compatible with our system.

## 2024-08-23 NOTE — PROGRESS NOTE ADULT - PROBLEM SELECTOR PLAN 2
hx of esophogeal stricture, was scheduled for outpt EGD dilation yesterday (not completed due to hgb 6.9)  Hx of prior EGD with Dr. Lyons showing pinhole like stricture  GI Dr. Bourgeois following    post transfusions Hgb goal 7-8 for anesthesia  AM Hgb: 8.3   s/p EGD w/ dilation

## 2024-08-23 NOTE — PACU DISCHARGE NOTE - HYDRATION STATUS:
Assessment/Plan:    S/P BKA (below knee amputation), left (RUST 75 )  Presents for post-op check  Doing well  Pain is well controlled  Not having significant pain  Overall amputation site is healing well  Removed a few sutures in clinic today however left the majority intact  Given multiple incisions from previous failed bypass, will reassess in two weeks and likely remove remaining sutures then  Overall healing as expected  Mild epidermolysis in expected areas no open areas of breakdown or dehiscence  Clinic f/u in 2 weeks          Problem List Items Addressed This Visit        Other    S/P BKA (below knee amputation), left (RUST 75 ) - Primary     Presents for post-op check  Doing well  Pain is well controlled  Not having significant pain  Overall amputation site is healing well  Removed a few sutures in clinic today however left the majority intact  Given multiple incisions from previous failed bypass, will reassess in two weeks and likely remove remaining sutures then  Overall healing as expected  Mild epidermolysis in expected areas no open areas of breakdown or dehiscence  Clinic f/u in 2 weeks                  Subjective:      Patient ID: Emmie Lei is a 76 y o  male  Patient is PO L BKA done on 7/17/21  Patient reports phantom pain but denies drainage to incision sites, numbness, or tingling  Incision looks clean and dry with mild drainage  Patient is taking atorvastatin, Plavix, and Xarelto  The following portions of the patient's history were reviewed and updated as appropriate: allergies, current medications, past family history, past medical history, past social history, past surgical history and problem list     Review of Systems   Constitutional: Negative  HENT: Negative  Eyes: Negative  Respiratory: Negative  Cardiovascular: Negative  Gastrointestinal: Negative  Endocrine: Negative  Genitourinary: Negative  Musculoskeletal: Negative  Skin: Negative  Allergic/Immunologic: Negative  Neurological: Negative  Hematological: Negative  Psychiatric/Behavioral: Negative  I have reviewed and updated the ROS as appropriate  Objective:      /70 (BP Location: Right arm, Patient Position: Sitting, Cuff Size: Standard)   Pulse 81   Temp 98 4 °F (36 9 °C) (Tympanic)   Ht 5' 4" (1 626 m)   Wt 83 9 kg (185 lb)   BMI 31 76 kg/m²          Physical Exam  Constitutional:       Appearance: Normal appearance  HENT:      Head: Normocephalic and atraumatic  Mouth/Throat:      Mouth: Mucous membranes are moist    Eyes:      Extraocular Movements: Extraocular movements intact  Pupils: Pupils are equal, round, and reactive to light  Cardiovascular:      Rate and Rhythm: Normal rate and regular rhythm  Pulmonary:      Effort: Pulmonary effort is normal       Breath sounds: Normal breath sounds  Abdominal:      General: Abdomen is flat  Palpations: Abdomen is soft  Musculoskeletal:         General: Normal range of motion  Comments: Left BKA stump, healing well  Mild epidermolysis along the incision  Mainly in the corners  No open wounds  6 sutures removed, most left intact  No signs of infection, no significant swelling    Skin:     General: Skin is warm and dry  Capillary Refill: Capillary refill takes less than 2 seconds  Neurological:      General: No focal deficit present  Mental Status: He is alert and oriented to person, place, and time  Psychiatric:         Mood and Affect: Mood normal          Behavior: Behavior normal          Thought Content:  Thought content normal          Judgment: Judgment normal  Satisfactory

## 2024-08-23 NOTE — DISCHARGE NOTE PROVIDER - CARE PROVIDER_API CALL
KENDRA LAY, Phys,    Phone: [phone])-  Fax: ()-  Follow Up Time:    KENDRA LAY, Phys,    Phone: ()-  Fax: ()-  Follow Up Time:     Mikey Bourgeois  Gastroenterology  [address] [facility] [place] [address]  Phone: [phone]  Fax: [phone]  Follow Up Time:

## 2024-08-23 NOTE — PROGRESS NOTE ADULT - SUBJECTIVE AND OBJECTIVE BOX
PGY-1 Progress Note discussed with attending      PLEASE CONTACT ON CALL TEAM:  - On Call Team (Please refer to Alejandro) FROM 5:00 PM - 8:30PM  - Nightfloat Team FROM 8:30 -7:30 AM    INTERVAL HPI/OVERNIGHT EVENTS: Admitted to medicine for anemia.  This AM pt examined at bedside. No acute complaints. Pt states she was having nonspecific sx of headache, light-headedness, weakness at home prior to scheduled endoscopy procedure. Sx have improved following transfusion. Has a known hx of anemia but did not take her iron pills due to dysphagia. Dysphagia to solids+liquids; has a lot of throat tightness and pain when trying to swallow. Requesting liquid iron on d/c rather than pills.     ID: [id number]    REVIEW OF SYSTEMS:  CONSTITUTIONAL: No fever, weight loss, or fatigue  RESPIRATORY: No cough, wheezing, chills or hemoptysis; No shortness of breath  CARDIOVASCULAR: No chest pain, palpitations, dizziness, or leg swelling  GASTROINTESTINAL: No abdominal pain. No nausea, vomiting, or hematemesis; No diarrhea or constipation. No melena or hematochezia.  GENITOURINARY: No dysuria or hematuria, urinary frequency  NEUROLOGICAL: +mild headaches, no memory loss, loss of strength, numbness, or tremors  SKIN: No itching, burning, rashes, or lesions     MEDICATIONS  (STANDING):    MEDICATIONS  (PRN):  acetaminophen     Tablet .. 650 milliGRAM(s) Oral every 6 hours PRN Temp greater or equal to 38C (100.4F), Mild Pain (1 - 3)      Vital Signs Last 24 Hrs  T(C): 36.7 (23 Aug 2024 13:29), Max: 37 (22 Aug 2024 15:02)  T(F): 98.1 (23 Aug 2024 13:29), Max: 98.6 (22 Aug 2024 15:02)  HR: 83 (23 Aug 2024 13:29) (83 - 92)  BP: 116/68 (23 Aug 2024 13:29) (104/72 - 116/70)  BP(mean): 882 (23 Aug 2024 05:06) (882 - 882)  RR: 18 (23 Aug 2024 13:29) (16 - 18)  SpO2: 100% (23 Aug 2024 13:29) (98% - 100%)    Parameters below as of 23 Aug 2024 13:29  Patient On (Oxygen Delivery Method): room air        PHYSICAL EXAMINATION:  GENERAL: NAD  HEAD:  Atraumatic, Normocephalic  EYES:  conjunctiva and sclera clear, no conjunctival rim pallor   NECK: Supple, No JVD, Normal thyroid  CHEST/LUNG: Clear to auscultation. Clear to percussion bilaterally  HEART: Regular rate and rhythm  ABDOMEN: Soft, Nontender, Nondistended; Bowel sounds present, no pain or masses on palpation  NERVOUS SYSTEM:  Alert & Oriented X3  EXTREMITIES:  2+ Peripheral Pulses, No clubbing, cyanosis, or edema  SKIN: warm dry                          8.3    4.92  )-----------( 304      ( 23 Aug 2024 06:48 )             28.4     08-23    142  |  109<H>  |  9   ----------------------------<  91  4.0   |  25  |  0.50    Ca    8.7      23 Aug 2024 06:48    TPro  7.1  /  Alb  3.4<L>  /  TBili  0.9  /  DBili  x   /  AST  11  /  ALT  20  /  AlkPhos  100  08-23    LIVER FUNCTIONS - ( 23 Aug 2024 06:48 )  Alb: 3.4 g/dL / Pro: 7.1 g/dL / ALK PHOS: 100 U/L / ALT: 20 U/L DA / AST: 11 U/L / GGT: x               PT/INR - ( 23 Aug 2024 06:48 )   PT: 11.8 sec;   INR: 1.04 ratio         PTT - ( 23 Aug 2024 06:48 )  PTT:32.9 sec    I&O's Summary          CAPILLARY BLOOD GLUCOSE      RADIOLOGY & ADDITIONAL TESTS:        < from: EGD (08.23.24 @ 11:00) >  Impressions:        Severe proximal esophageal stricture as above. (Biopsy, Dilation to 8 mm).      < end of copied text >

## 2024-08-23 NOTE — DISCHARGE NOTE PROVIDER - PROVIDER TOKENS
PROVIDER:[TOKEN:[[id number]]] PROVIDER:[TOKEN:[[id number]]],PROVIDER:[TOKEN:[[id number]]]

## 2024-08-23 NOTE — DISCHARGE NOTE PROVIDER - CARE PROVIDERS DIRECT ADDRESSES
[email or web address]

## 2024-08-23 NOTE — DISCHARGE NOTE PROVIDER - HOSPITAL COURSE
Pt is a 33-year-old female, w/ hx of dysphagia, anemia, sleeve gastrectomy, GERD, presenting from endoscopy suite where she was scheduled to have stricture dilation with GI yesterday and outpatient laboratories showed a hemoglobin of 6.9. Due to her anemia, EGD was cancelled and pt sent to ED for blood transfusion.   In ED, s/p 1 u PRBC transfusion. Admitted to medicine telemetry floor for anemia. Pt reported on admission that she experiences dysphagia with solids+liquids, has a lot of throat tightness and pain when trying to swallow, is unable to take oral iron supplements. GI Dr. Bourgeois was consulted. EGD +dilation was scheduled for 8/23 inpatient. On labs, pt was found to have Hgb 7.1, MCV 59 (microcytic anemia) without signs of GIB, and iron studies revealing iron deficiency anemia (ferritin 2, iron 14, iron binding capacity 380, % sat 4, TIBC 394). Repeat Hb post-transfusion was 8.4. AM Hb was 8.3, stable. She was discharged with prescription for liquid iron supplementation.   Pt underwent EGD without complications. Findings of EGD are as follows: In the esophagus, a severe, ring-like benign-appearing stenosis with an internal diameter of 5 mm and length of <1 cm was found in the proximal esophagus at 17 cm from the incisors. Dilation was performed in serial fashion to a maximal diameter of 8 mm. Stricture rings raise suspicion for eosinophilic esophagitis. Biopsies were taken for analysis. In the stomach, A small size hiatal hernia was seen. Evidence of prior sleeve gastrectomy. No findings in the duodenum. Pt was advised to take Pantoprazole 40 mg orally twice daily, get repeat EGD in 2-3 weeks for repeat dilation.    In setting of possible GIB, pt was given SCDs for DVT ppx inpatient.    Patient is stable for discharge per attending and is advised to follow up with PCP as outpatient  Please refer to patient's complete medical chart with documents for a full hospital course, for this is only a brief summary.

## 2024-08-27 LAB — SURGICAL PATHOLOGY STUDY: SIGNIFICANT CHANGE UP

## 2024-08-29 RX ORDER — PANTOPRAZOLE SODIUM 40 MG/1
40 GRANULE, DELAYED RELEASE ORAL
Qty: 60 | Refills: 1 | Status: ACTIVE | COMMUNITY
Start: 2024-08-29 | End: 1900-01-01

## 2024-09-04 ENCOUNTER — APPOINTMENT (OUTPATIENT)
Dept: GASTROENTEROLOGY | Facility: CLINIC | Age: 34
End: 2024-09-04
Payer: COMMERCIAL

## 2024-09-04 VITALS — HEIGHT: 63 IN | BODY MASS INDEX: 22.86 KG/M2 | WEIGHT: 129 LBS

## 2024-09-04 DIAGNOSIS — R13.10 DYSPHAGIA, UNSPECIFIED: ICD-10-CM

## 2024-09-04 DIAGNOSIS — K22.2 ESOPHAGEAL OBSTRUCTION: ICD-10-CM

## 2024-09-04 PROCEDURE — 99213 OFFICE O/P EST LOW 20 MIN: CPT

## 2024-09-04 PROCEDURE — G2211 COMPLEX E/M VISIT ADD ON: CPT | Mod: NC

## 2024-09-04 RX ORDER — PANTOPRAZOLE 40 MG/1
40 TABLET, DELAYED RELEASE ORAL TWICE DAILY
Qty: 180 | Refills: 3 | Status: ACTIVE | COMMUNITY
Start: 2024-08-27 | End: 1900-01-01

## 2024-09-04 NOTE — REASON FOR VISIT
[Home] : at home, [unfilled] , at the time of the visit. [Medical Office: ([facility])___] : at the medical office located in  [Patient] : the patient [Self] : self [This encounter was initiated by telehealth (audio with video) and converted to telephone (audio only) due to technical difficulties.] : This encounter was initiated by telehealth (audio with video) and converted to telephone (audio only) due to technical difficulties. [Follow-up] : a follow-up of an existing diagnosis

## 2024-09-04 NOTE — PHYSICAL EXAM
[Alert] : alert [Normal Voice/Communication] : normal voice/communication [Healthy Appearing] : healthy appearing [No Acute Distress] : no acute distress [Sclera] : the sclera and conjunctiva were normal [Hearing Threshold Finger Rub Not Chambers] : hearing was normal [Normal Lips/Gums] : the lips and gums were normal [Oropharynx] : the oropharynx was normal [Normal Appearance] : the appearance of the neck was normal [No Neck Mass] : no neck mass was observed [No Respiratory Distress] : no respiratory distress [No Acc Muscle Use] : no accessory muscle use [Respiration, Rhythm And Depth] : normal respiratory rhythm and effort [Auscultation Breath Sounds / Voice Sounds] : lungs were clear to auscultation bilaterally [Heart Rate And Rhythm] : heart rate was normal and rhythm regular [Normal S1, S2] : normal S1 and S2 [Murmurs] : no murmurs [Bowel Sounds] : normal bowel sounds [Abdomen Tenderness] : non-tender [No Masses] : no abdominal mass palpated [Abdomen Soft] : soft [] : no hepatosplenomegaly [Oriented To Time, Place, And Person] : oriented to person, place, and time

## 2024-09-04 NOTE — HISTORY OF PRESENT ILLNESS
[FreeTextEntry1] : 33F with pmhx of sleeve gastrectomy presenting for follow-up on esophageal stricture. Had EGD dilation of severe esophageal stricture 8/26. Biopsies negative for EOE. Favor reflux induced stricture. Pt states since esophageal stricture dilation has been doing very well. Now tolerating solid foods (previously only liquids). Denies any other complaints, no abd pain, n/v/d/c, melena, hematochezia, fever/chills, jaundice, dark urine, or other issues.

## 2024-09-04 NOTE — ASSESSMENT
[FreeTextEntry1] : 33F with pmhx of sleeve gastrectomy presenting for follow-up on esophageal stricture. Had EGD dilation of severe esophageal stricture 8/26. Biopsies negative for EOE. Favor reflux induced stricture. Pt states since esophageal stricture dilation has been doing very well. Now tolerating solid foods (previously only liquids). Denies any other complaints, no abd pain, n/v/d/c, melena, hematochezia, fever/chills, jaundice, dark urine, or other issues.  - Schedule repeat EGD serial dilations.  Total time spent to complete patient's assessment, review medical chart including labs & personal review of prior imaging and available endoscopy records, counseling and discussion of plan of care was more than 30 minutes.

## 2024-09-19 ENCOUNTER — NON-APPOINTMENT (OUTPATIENT)
Age: 34
End: 2024-09-19

## 2024-10-16 ENCOUNTER — NON-APPOINTMENT (OUTPATIENT)
Age: 34
End: 2024-10-16

## 2024-10-25 ENCOUNTER — OUTPATIENT (OUTPATIENT)
Dept: OUTPATIENT SERVICES | Facility: HOSPITAL | Age: 34
LOS: 1 days | Discharge: HOME | End: 2024-10-25
Payer: COMMERCIAL

## 2024-10-25 ENCOUNTER — APPOINTMENT (OUTPATIENT)
Dept: GASTROENTEROLOGY | Facility: HOSPITAL | Age: 34
End: 2024-10-25

## 2024-10-25 ENCOUNTER — RESULT REVIEW (OUTPATIENT)
Age: 34
End: 2024-10-25

## 2024-10-25 ENCOUNTER — TRANSCRIPTION ENCOUNTER (OUTPATIENT)
Age: 34
End: 2024-10-25

## 2024-10-25 VITALS
RESPIRATION RATE: 20 BRPM | HEART RATE: 85 BPM | OXYGEN SATURATION: 100 % | DIASTOLIC BLOOD PRESSURE: 60 MMHG | SYSTOLIC BLOOD PRESSURE: 108 MMHG

## 2024-10-25 VITALS
SYSTOLIC BLOOD PRESSURE: 115 MMHG | WEIGHT: 128.97 LBS | RESPIRATION RATE: 14 BRPM | HEIGHT: 63 IN | DIASTOLIC BLOOD PRESSURE: 77 MMHG | HEART RATE: 82 BPM | TEMPERATURE: 97 F | OXYGEN SATURATION: 99 %

## 2024-10-25 DIAGNOSIS — Z98.891 HISTORY OF UTERINE SCAR FROM PREVIOUS SURGERY: Chronic | ICD-10-CM

## 2024-10-25 DIAGNOSIS — K22.2 ESOPHAGEAL OBSTRUCTION: ICD-10-CM

## 2024-10-25 LAB — HCG UR QL: NEGATIVE — SIGNIFICANT CHANGE UP

## 2024-10-25 PROCEDURE — 88305 TISSUE EXAM BY PATHOLOGIST: CPT | Mod: 26

## 2024-10-25 PROCEDURE — 43239 EGD BIOPSY SINGLE/MULTIPLE: CPT | Mod: 59

## 2024-10-25 PROCEDURE — 43249 ESOPH EGD DILATION <30 MM: CPT

## 2024-10-25 DEVICE — CATH BLLN CRE 10-12MMX5.5CM: Type: IMPLANTABLE DEVICE | Status: FUNCTIONAL

## 2024-10-25 NOTE — PRE PROCEDURE NOTE - PRE PROCEDURE EVALUATION
HPI:    Here for endoscopy.    PAST MEDICAL & SURGICAL HISTORY:  Spontaneous   x 1      History of tubal ligation      Dysphagia      Previous  section  c/s 2008 m 6-8 failure to decend  2012 repeat f 8-9 pec in OR-no meds        See chart.    MEDICATIONS:    See chart.     ALLERGIES:  aspirin (Rash)    See chart.     SOCIAL HISTORY:     Denies toxic habits.     FAMILY HISTORY:    Noncontributory.     REVIEW OF SYSTEMS:  CONSTITUTIONAL: No weakness, fevers or chills.  EYES/ENT: No visual changes;  No vertigo or throat pain.  NECK: No pain or stiffness.  RESPIRATORY: No cough, wheezing, hemoptysis; No shortness of breath.  CARDIOVASCULAR: No chest pain or palpitations.  GASTROINTESTINAL: As per HPI.   GENITOURINARY: No dysuria, frequency or hematuria.  NEUROLOGICAL: No numbness or weakness.  SKIN: No itching, rashes.      PHYSICAL EXAM:  VITAL SIGNS:  T(C): --  HR: --  BP: --  RR: --  SpO2: --  I/Os:     See chart.     GENERAL: CINDY, non toxic, comfortable in bed  HEENT: EOMI, no icterus, no tracheal deviation, moist mucus membranes   CARDIO: Regular rate and rhythm, no murmurs, rubs or gallops  LUNGS: No wheezing, rales or rhonchi  ABDOMEN: Soft, non tender, non distended, no rebound or guarding  VASCULAR: Warm and well perfused without peripheral edema and palpable pulses  PSYCH AAO x 3, normal mood and affect   SKIN: warm, dry, intact     LABS:                 RADIOLOGY & ADDITIONAL TESTS: Reviewed.       Risks, benefits, and alternatives of the procedure discussed at length including but not limited to bleeding, perforation, anesthesia related complication, infection, etc. Patient expressed understanding and agreeable for procedure. Patient stable for planned procedure.

## 2024-10-25 NOTE — ASU DISCHARGE PLAN (ADULT/PEDIATRIC) - NS MD DC FALL RISK RISK
For information on Fall & Injury Prevention, visit: [email or web address] OR  [email or web address] OR  https://www.cdc.gov/steadi/patient.html

## 2024-10-25 NOTE — ASU DISCHARGE PLAN (ADULT/PEDIATRIC) - FINANCIAL ASSISTANCE
[facility] provides services at a reduced cost to those who are determined to be eligible through [facility]’s financial assistance program. Information regarding [facility]’s financial assistance program can be found by going to [email or web address] or by calling [phone].

## 2024-10-25 NOTE — ASU PREOP CHECKLIST - BMI (KG/M2)
Please get blood work done before follow up in 3 months      Overactive Bladder   AMBULATORY CARE:   Overactive bladder  is a sudden urge to urinate that is difficult for you to control  It occurs when the muscles of the bladder contract (tighten) more than normal  This causes a frequent or sudden need to urinate  You usually have to urinate more than 8 times in 24 hours  You may need to get up more than once in the middle of the night to urinate  You may also leak urine before you are able to make it to the bathroom  Call your doctor if:   Your urine is pink, or you notice blood in your urine  You have pain with urination  You continue to have symptoms even after you take your medicine  You have questions or concerns about your condition or care  Self-care:   Train your bladder  Go to the bathroom at set times, such as every 2 hours, even if you do not feel the urge to go  You can also try to hold your urine when you feel the urge to go  For example, hold your urine for 5 minutes when you feel the urge to go  As that becomes easier, hold your urine for 10 minutes  Work up to every 3 or 4 hours to help control your bladder  Limit liquids as directed  This may help decrease the amount you urinate  Ask how much liquid to drink each day and which liquids are best for you  Avoid liquids several hours before you go to sleep  Limit caffeine and alcohol  Exercise regularly and maintain a healthy weight  Ask your healthcare provider how much you should weigh and about the best exercise plan for you  Extra weight puts pressure on your bladder and may make your symptoms worse  Ask him or her to help you create a weight loss plan if you are overweight  Do pelvic muscle exercises often  Your pelvic muscles help you stop urinating  Squeeze these muscles tightly for 5 seconds, then relax for 5 seconds  Gradually work up to squeezing for 10 seconds  Do 3 sets of 15 repetitions a day, or as directed  This will help strengthen your pelvic muscles and improve bladder control  Medicines:   Medicines  may be given to relax your bladder and decrease urination  Take your medicine as directed  Contact your healthcare provider if you think your medicine is not helping or if you have side effects  Tell him or her if you are allergic to any medicine  Keep a list of the medicines, vitamins, and herbs you take  Include the amounts, and when and why you take them  Bring the list or the pill bottles to follow-up visits  Carry your medicine list with you in case of an emergency  Follow up with your doctor as directed: If you keep a urination log, take it with you to your follow-up visits  Write down your questions so you remember to ask them during your visits  © Copyright [employer] 2022 Information is for End User's use only and may not be sold, redistributed or otherwise used for commercial purposes  All illustrations and images included in CareNotes® are the copyrighted property of A [employer] M [employer] Inc  or [employer]   The above information is an  only  It is not intended as medical advice for individual conditions or treatments  Talk to your doctor, nurse or pharmacist before following any medical regimen to see if it is safe and effective for you  22.8

## 2024-10-31 LAB — SURGICAL PATHOLOGY STUDY: SIGNIFICANT CHANGE UP

## 2025-01-23 ENCOUNTER — LABORATORY RESULT (OUTPATIENT)
Age: 35
End: 2025-01-23

## 2025-01-23 ENCOUNTER — APPOINTMENT (OUTPATIENT)
Dept: INTERNAL MEDICINE | Facility: CLINIC | Age: 35
End: 2025-01-23
Payer: COMMERCIAL

## 2025-01-23 VITALS
TEMPERATURE: 97.9 F | RESPIRATION RATE: 16 BRPM | OXYGEN SATURATION: 99 % | HEIGHT: 63 IN | SYSTOLIC BLOOD PRESSURE: 128 MMHG | BODY MASS INDEX: 23.04 KG/M2 | WEIGHT: 130 LBS | DIASTOLIC BLOOD PRESSURE: 66 MMHG | HEART RATE: 85 BPM

## 2025-01-23 VITALS — SYSTOLIC BLOOD PRESSURE: 122 MMHG | DIASTOLIC BLOOD PRESSURE: 72 MMHG

## 2025-01-23 DIAGNOSIS — Z83.3 FAMILY HISTORY OF DIABETES MELLITUS: ICD-10-CM

## 2025-01-23 DIAGNOSIS — Z80.0 FAMILY HISTORY OF MALIGNANT NEOPLASM OF DIGESTIVE ORGANS: ICD-10-CM

## 2025-01-23 DIAGNOSIS — Z02.89 ENCOUNTER FOR OTHER ADMINISTRATIVE EXAMINATIONS: ICD-10-CM

## 2025-01-23 DIAGNOSIS — R13.10 DYSPHAGIA, UNSPECIFIED: ICD-10-CM

## 2025-01-23 DIAGNOSIS — D64.9 ANEMIA, UNSPECIFIED: ICD-10-CM

## 2025-01-23 DIAGNOSIS — Z00.00 ENCOUNTER FOR GENERAL ADULT MEDICAL EXAMINATION W/OUT ABNORMAL FINDINGS: ICD-10-CM

## 2025-01-23 PROCEDURE — 99385 PREV VISIT NEW AGE 18-39: CPT

## 2025-01-27 LAB
ALBUMIN SERPL ELPH-MCNC: 4.5 G/DL
ALP BLD-CCNC: 126 U/L
ALT SERPL-CCNC: 15 U/L
ANION GAP SERPL CALC-SCNC: 11 MMOL/L
AST SERPL-CCNC: 20 U/L
BASOPHILS # BLD AUTO: 0.06 K/UL
BASOPHILS NFR BLD AUTO: 0.8 %
BILIRUB SERPL-MCNC: 0.6 MG/DL
BUN SERPL-MCNC: 11 MG/DL
CALCIUM SERPL-MCNC: 9.7 MG/DL
CHLORIDE SERPL-SCNC: 102 MMOL/L
CHOLEST SERPL-MCNC: 155 MG/DL
CO2 SERPL-SCNC: 25 MMOL/L
CREAT SERPL-MCNC: 0.53 MG/DL
EGFR: 124 ML/MIN/1.73M2
EOSINOPHIL # BLD AUTO: 0.05 K/UL
EOSINOPHIL NFR BLD AUTO: 0.6 %
ESTIMATED AVERAGE GLUCOSE: 108 MG/DL
FOLATE SERPL-MCNC: 8.6 NG/ML
GLUCOSE SERPL-MCNC: 90 MG/DL
HBA1C MFR BLD HPLC: 5.4 %
HCT VFR BLD CALC: 29.5 %
HDLC SERPL-MCNC: 61 MG/DL
HGB BLD-MCNC: 8.6 G/DL
IMM GRANULOCYTES NFR BLD AUTO: 0.3 %
LDLC SERPL CALC-MCNC: 82 MG/DL
LYMPHOCYTES # BLD AUTO: 1.76 K/UL
LYMPHOCYTES NFR BLD AUTO: 22.1 %
M TB IFN-G BLD-IMP: NEGATIVE
MAN DIFF?: NORMAL
MCHC RBC-ENTMCNC: 18.7 PG
MCHC RBC-ENTMCNC: 29.2 G/DL
MCV RBC AUTO: 64 FL
MEV IGG FLD QL IA: <5 AU/ML
MEV IGG+IGM SER-IMP: NEGATIVE
MONOCYTES # BLD AUTO: 0.46 K/UL
MONOCYTES NFR BLD AUTO: 5.8 %
MUV AB SER-ACNC: POSITIVE
MUV IGG SER QL IA: 28.7 AU/ML
NEUTROPHILS # BLD AUTO: 5.63 K/UL
NEUTROPHILS NFR BLD AUTO: 70.4 %
NONHDLC SERPL-MCNC: 93 MG/DL
PLATELET # BLD AUTO: 420 K/UL
POTASSIUM SERPL-SCNC: 4.1 MMOL/L
PROT SERPL-MCNC: 7.7 G/DL
QUANTIFERON TB PLUS MITOGEN MINUS NIL: >10 IU/ML
QUANTIFERON TB PLUS NIL: 0.04 IU/ML
QUANTIFERON TB PLUS TB1 MINUS NIL: 0.02 IU/ML
QUANTIFERON TB PLUS TB2 MINUS NIL: 0.02 IU/ML
RBC # BLD: 4.61 M/UL
RBC # FLD: 18 %
RUBV IGG FLD-ACNC: 12.5 INDEX
RUBV IGG SER-IMP: POSITIVE
SODIUM SERPL-SCNC: 138 MMOL/L
TRIGL SERPL-MCNC: 55 MG/DL
TSH SERPL-ACNC: 1.23 UIU/ML
VIT B12 SERPL-MCNC: 528 PG/ML
VZV AB TITR SER: POSITIVE
VZV IGG SER IF-ACNC: 16.3 S/CO
WBC # FLD AUTO: 7.98 K/UL

## 2025-01-29 LAB
FERRITIN SERPL-MCNC: 4 NG/ML
IRON SATN MFR SERPL: 2 %
IRON SERPL-MCNC: 10 UG/DL
TIBC SERPL-MCNC: 403 UG/DL
UIBC SERPL-MCNC: 394 UG/DL

## 2025-02-12 NOTE — ASU PATIENT PROFILE, ADULT - FALL HARM RISK - UNIVERSAL INTERVENTIONS
Bed in lowest position, wheels locked, appropriate side rails in place/Call bell, personal items and telephone in reach/Instruct patient to call for assistance before getting out of bed or chair/Non-slip footwear when patient is out of bed/[place] to call system/Physically safe environment - no spills, clutter or unnecessary equipment/Purposeful Proactive Rounding/Room/bathroom lighting operational, light cord in reach

## 2025-02-13 ENCOUNTER — APPOINTMENT (OUTPATIENT)
Dept: GASTROENTEROLOGY | Facility: HOSPITAL | Age: 35
End: 2025-02-13

## 2025-02-13 ENCOUNTER — OUTPATIENT (OUTPATIENT)
Dept: OUTPATIENT SERVICES | Facility: HOSPITAL | Age: 35
LOS: 1 days | Discharge: HOME | End: 2025-02-13
Payer: COMMERCIAL

## 2025-02-13 ENCOUNTER — TRANSCRIPTION ENCOUNTER (OUTPATIENT)
Age: 35
End: 2025-02-13

## 2025-02-13 VITALS
DIASTOLIC BLOOD PRESSURE: 64 MMHG | HEART RATE: 68 BPM | SYSTOLIC BLOOD PRESSURE: 107 MMHG | OXYGEN SATURATION: 100 % | RESPIRATION RATE: 14 BRPM

## 2025-02-13 VITALS
HEIGHT: 63 IN | TEMPERATURE: 98 F | OXYGEN SATURATION: 100 % | HEART RATE: 70 BPM | DIASTOLIC BLOOD PRESSURE: 71 MMHG | WEIGHT: 130.07 LBS | RESPIRATION RATE: 22 BRPM | SYSTOLIC BLOOD PRESSURE: 138 MMHG

## 2025-02-13 DIAGNOSIS — R13.10 DYSPHAGIA, UNSPECIFIED: ICD-10-CM

## 2025-02-13 DIAGNOSIS — Z98.891 HISTORY OF UTERINE SCAR FROM PREVIOUS SURGERY: Chronic | ICD-10-CM

## 2025-02-13 LAB — HCG UR QL: NEGATIVE — SIGNIFICANT CHANGE UP

## 2025-02-13 PROCEDURE — 43249 ESOPH EGD DILATION <30 MM: CPT

## 2025-02-13 DEVICE — CATH ESOPH/PYLO/COL DIL12-15MM: Type: IMPLANTABLE DEVICE | Status: FUNCTIONAL

## 2025-02-13 NOTE — PRE PROCEDURE NOTE - PRE PROCEDURE EVALUATION
HPI:    Here for EGD dilation for esophageal stricture.    PAST MEDICAL & SURGICAL HISTORY:  Spontaneous   x 1      History of tubal ligation      Dysphagia      Previous  section  c/s 2008 m 6-8 failure to decend  2012 repeat f 8-9 pec in OR-no meds        See chart.    MEDICATIONS:    See chart.     ALLERGIES:  aspirin (Rash)    See chart.     SOCIAL HISTORY:     Denies toxic habits.     FAMILY HISTORY:    Noncontributory.     REVIEW OF SYSTEMS:  CONSTITUTIONAL: No weakness, fevers or chills.  EYES/ENT: No visual changes;  No vertigo or throat pain.  NECK: No pain or stiffness.  RESPIRATORY: No cough, wheezing, hemoptysis; No shortness of breath.  CARDIOVASCULAR: No chest pain or palpitations.  GASTROINTESTINAL: As per HPI.   GENITOURINARY: No dysuria, frequency or hematuria.  NEUROLOGICAL: No numbness or weakness.  SKIN: No itching, rashes.      PHYSICAL EXAM:  VITAL SIGNS:  T(C): --  HR: --  BP: --  RR: --  SpO2: --  I/Os:     See chart.     GENERAL: CINDY, non toxic, comfortable in bed  HEENT: EOMI, no icterus, no tracheal deviation, moist mucus membranes   CARDIO: Regular rate and rhythm, no murmurs, rubs or gallops  LUNGS: No wheezing, rales or rhonchi  ABDOMEN: Soft, non tender, non distended, no rebound or guarding  VASCULAR: Warm and well perfused without peripheral edema and palpable pulses  PSYCH AAO x 3, normal mood and affect   SKIN: warm, dry, intact     LABS:                 RADIOLOGY & ADDITIONAL TESTS: Reviewed.       Risks, benefits, and alternatives of the procedure discussed at length including but not limited to bleeding, perforation, anesthesia related complication, infection, etc. Patient expressed understanding and agreeable for procedure. Patient stable for planned procedure. 
constant

## 2025-02-13 NOTE — ASU DISCHARGE PLAN (ADULT/PEDIATRIC) - FINANCIAL ASSISTANCE
[facility] provides services at a reduced cost to those who are determined to be eligible through [facility]’s financial assistance program. Information regarding [facility]’s financial assistance program can be found by going to [email or web address] or by calling [phone].

## 2025-02-13 NOTE — ASU DISCHARGE PLAN (ADULT/PEDIATRIC) - NS MD DC FALL RISK RISK
For information on Fall & Injury Prevention, visit: [email or web address] OR  [email or web address] OR  https://www.cdc.gov/steadi/patient.html

## 2025-02-13 NOTE — ASU PREOP CHECKLIST - HEIGHT IN FEET
Reviewed FLP  , total cholesterol 254  Recommended starting atorvastatin 40 mg HS  Side effects reviewed    Lifestyle modification with diet and exercise 5

## 2025-05-22 ENCOUNTER — EMERGENCY (EMERGENCY)
Facility: HOSPITAL | Age: 35
LOS: 1 days | End: 2025-05-22
Attending: EMERGENCY MEDICINE | Admitting: EMERGENCY MEDICINE
Payer: COMMERCIAL

## 2025-05-22 VITALS
HEART RATE: 97 BPM | OXYGEN SATURATION: 98 % | WEIGHT: 138.01 LBS | RESPIRATION RATE: 16 BRPM | TEMPERATURE: 98 F | DIASTOLIC BLOOD PRESSURE: 57 MMHG | SYSTOLIC BLOOD PRESSURE: 111 MMHG

## 2025-05-22 DIAGNOSIS — Z98.891 HISTORY OF UTERINE SCAR FROM PREVIOUS SURGERY: Chronic | ICD-10-CM

## 2025-05-22 PROCEDURE — 99284 EMERGENCY DEPT VISIT MOD MDM: CPT

## 2025-05-22 RX ORDER — MUPIROCIN CALCIUM 20 MG/G
1 CREAM TOPICAL ONCE
Refills: 0 | Status: COMPLETED | OUTPATIENT
Start: 2025-05-22 | End: 2025-05-22

## 2025-05-22 RX ADMIN — Medication 1000 MILLIGRAM(S): at 19:26

## 2025-05-22 RX ADMIN — MUPIROCIN CALCIUM 1 APPLICATION(S): 20 CREAM TOPICAL at 19:26

## 2025-05-22 NOTE — ED ADULT NURSE NOTE - LANGUAGE ASSISTANCE NEEDED
[facility] progress notes      Patient: Elmer Casper Date: 3/15/2024   : 1987 Attending: Scooter Gallardo DO   36 year old male      Subjective: Patient doing much better overnight and he is extubated and fully awake.  He was seen during dialysis this morning.  He feels fine and has no pain or fever.      Review of Systems:  Review of Systems   Constitutional:  Negative for chills and fever.   HENT:  Negative for congestion, facial swelling, mouth sores and sore throat.    Eyes:  Negative for visual disturbance.   Respiratory:  Negative for cough, shortness of breath and wheezing.    Cardiovascular:  Negative for chest pain, palpitations and leg swelling.   Gastrointestinal:  Negative for abdominal distention, abdominal pain, diarrhea, nausea and vomiting.   Endocrine: Negative for polyuria.   Genitourinary:  Negative for difficulty urinating, dysuria, flank pain, frequency and hematuria.   Musculoskeletal:  Negative for back pain, joint swelling, myalgias, neck pain and neck stiffness.   Skin:  Negative for rash and wound.   Neurological:  Negative for seizures, weakness and headaches.           Vital Last Value 24 Hour Range   Temperature 98.1 °F (36.7 °C) (03/15/24 0800) Temp  Min: 97.5 °F (36.4 °C)  Max: 98.6 °F (37 °C)   Pulse 70 (03/15/24 1200) Pulse  Min: 59  Max: 74   Respiratory (!) 30 (03/15/24 1200) Resp  Min: 12  Max: 42   Non-Invasive  Blood Pressure 123/63 (03/15/24 1200) BP  Min: 120/65  Max: 142/66   Pulse Oximetry 100 % (03/15/24 1200) SpO2  Min: 97 %  Max: 100 %   Arterial   Blood Pressure 125/49 (03/15/24 1200) Arterial Line BP  Min: 117/45  Max: 149/69         Intake/Output:  Last Stool Occurrence: 1 (24 1000)      Physical Exam:  Physical Exam  Constitutional:       General: He is not in acute distress.     Appearance: He is well-developed. He is ill-appearing. He is not diaphoretic.   HENT:      Head: Normocephalic and atraumatic.   Eyes:      General:         Right eye: No  discharge.         Left eye: No discharge.   Cardiovascular:      Rate and Rhythm: Normal rate and regular rhythm.      Heart sounds: Normal heart sounds. No murmur heard.  Pulmonary:      Effort: No respiratory distress.      Breath sounds: Normal breath sounds. No wheezing.      Comments: Patient has a right anterior chest permacath and the site was clean.  There was no cellulitis of the chest wall.  He was on hemodialysis.  Chest:      Chest wall: No tenderness.   Abdominal:      General: Bowel sounds are normal.      Palpations: Abdomen is soft. There is no mass.      Tenderness: There is no abdominal tenderness. There is no right CVA tenderness or left CVA tenderness.   Musculoskeletal:         General: No tenderness or deformity.      Cervical back: Neck supple. No rigidity or tenderness.      Right lower leg: No edema.      Left lower leg: No edema.   Lymphadenopathy:      Cervical: No cervical adenopathy.   Skin:     General: Skin is warm.      Coloration: Skin is not jaundiced.      Findings: No bruising, erythema or rash.   Neurological:      Mental Status: He is alert and oriented to person, place, and time.      Cranial Nerves: No cranial nerve deficit.          Laboratory Results:    Lab Results   Component Value Date    SODIUM 135 03/15/2024    POTASSIUM 4.0 03/15/2024    BUN 23 (H) 03/15/2024    CREATININE 2.31 (H) 03/15/2024    GLUCOSE 144 (H) 03/15/2024    HCT 20.3 (L) 03/15/2024    HGB 6.5 (LL) 03/15/2024     03/15/2024    INR 3.3 03/15/2024    PTT 46 (H) 03/15/2024    BILIRUBIN 1.8 (H) 03/15/2024    AST 30 03/15/2024    GPT 53 03/15/2024    ALKPT 94 03/15/2024    ALBUMIN 2.1 (L) 03/15/2024    WBC 17.3 (H) 03/15/2024         Urinalysis:    Lab Results   Component Value Date    USPG 1.016 03/01/2024    UTPELC 98 (H) 03/05/2024    UWBC Negative 03/01/2024    URBC Moderate (A) 03/01/2024    UBILI Negative 03/01/2024    UPH 6.0 03/01/2024    UBACTR NONE SEEN 11/01/2020       Cultures:    Microbiology Results  (Last 10 results in the past 7 days)      Specimen   Gram Smear   Culture Result   Status       03/13/24  1136         Gram variable bacilli  Comment: Detected from anaerobic bottle after    15 hours.    [P]                   [P] - Preliminary Result                Imaging: Reviewed    Assessment:   1.  Gram variable bacilli bacteremia rule out pseudo bacteremia.  This is likely diphtheroids from the skin, however it is possible for it to turn out to be a gram-negative.  2.  Acute leukocytosis due to retinal hematoma.  3.  Acute on chronic anemia due to retinal hemorrhage.  The patient has had 4 blood transfusions.  4.  Questionable intraventricular thrombus.  5.  CKD requiring hemodialysis now.  6.  Acute hypoxic respiratory failure status post intubation.  7.  Hypertension with recent hypertensive emergency.      Plan:  1.  Please continue Zosyn 3.375 g IV every 12 hours for the meantime.  2.  Await identification of the gram variable marizol.  3.  If the organism is identified as a diphtheroid the antibiotics will be discontinued, repeat blood cultures negative x 2.  4.  Infectious disease will follow and give further recommendations as needed.  5.  Thank you Alanis Harrington NP, for the consultation and if there are any questions please feel free to call me.      Discussed with: Nurse, Family, and Patient   Yes-Patient/Caregiver accepts free interpretation services...

## 2025-05-22 NOTE — ED PROVIDER NOTE - CLINICAL SUMMARY MEDICAL DECISION MAKING FREE TEXT BOX
34-year-old female no past medical history presenting emerged department due to pain in the perirectal area for the past week.  Patient states that she was shaving the area with a new cream and start developing itching and pain to the area.  She states it is painful to defecate and she is open to having some lesions around the vaginal area as well.  Denies any history of herpes, fevers, chills, body aches, nausea, vomiting, diarrhea.    Patient is well-appearing on exam.  AO x 3 and afebrile.  Hemodynamic stable.  On exam of the rectum, open lesions present around the parietal area, but no signs of hemorrhoids.  Concern for folliculitis secondary to shaving of the area versus herpes.  Will order herpes swab, GC chlamydia, and give Valtrex.

## 2025-05-22 NOTE — ED ADULT TRIAGE NOTE - GLASGOW COMA SCALE: BEST VERBAL RESPONSE, MLM
History and Physical  [facility]    Patient's Name/Date of Birth: Swapna Rogers / 1987 (82 y.o.)    Date: April 20, 2021     HPI: Pt is a 28 y.o. female who presents to [facility] for general surgery evaluation for assistance during upcoming robotic-assisted laparoscopic left salpingo-oophorectomy and right salpingectomy due to left ovarian cyst with gyn-onc, Dr. Mahesh Gonzalez. General surgery is available for diagnostic laparoscopy, possible lysis of adhesions, possible small bowel resection during the gyn onc surgery. Patient with known history of Crohns disease receiving Stelara treatment most recently 3/11/2021 with next one scheduled for 3/6/2021. She has a history of urgent right hemicolectomy in 2010 due to Crohns but is uncertain if it was due to an obstruction, perforation, etc. Denies any changes in her Crohns for the last 10 years. Admits to diarrhea. Denies abdominal pain. Denies any nausea or vomiting. Her last colonoscopy was done on 2/3 at [facility] which showed multiple scattered erosions and ulcerations.      She has an appt with her PCP on April 29th and GI on May 3rd to evaluate       Past Medical History:   Diagnosis Date    ASCUS with positive high risk HPV 2013    Crohn's disease ([address]     Elevated LFTs     Headache     Hearing loss     Hypoglycemia        Past Surgical History:   Procedure Laterality Date    CHOLECYSTECTOMY, LAPAROSCOPIC  09/30/2019    CHOLECYSTECTOMY, LAPAROSCOPIC N/A 9/30/2019    XI LAPAROSCOPIC ROBOTIC CHOLECYSTECTOMY, FIREFLY performed by Sidra Bunn IV, DO at [address] [facility]  08/29/2006   [facility] EXTRACTION  12/2015    TYMPANOSTOMY TUBE PLACEMENT         Current Outpatient Medications   Medication Sig Dispense Refill    medroxyPROGESTERone (DEPO-PROVERA) 150 MG/ML injection Inject 1 mL into the muscle every 3 months 1 mL 3    ustekinumab (STELARA) 90 MG/ML SOSY prefilled syringe 90 mg      vitamin D (ERGOCALCIFEROL) 1.25 MG (01214 UT) CAPS capsule       Prenatal-FeFum-FA-DHA w/o A (PRENATAL + DHA) 27-1 & 250 MG THPK Take 1 tablet by mouth daily 30 each 12     No current facility-administered medications for this visit.         Allergies   Allergen Reactions    Claritin [Loratadine]     Guaifenesin Er     Loratadine     Robitussin (Alcohol Free) [Guaifenesin]     Infliximab Nausea And Vomiting       Family History   Problem Relation Age of Onset    Depression Mother     Hypertension Mother     Diabetes Mother     Hypertension Maternal Grandmother     Diabetes Maternal Grandmother     Cancer Maternal Grandfather         unknown    Hypertension Sister     Breast Cancer Paternal Grandmother         [de-identified]    Colon Cancer Neg Hx     Ovarian Cancer Neg Hx        Social History     Socioeconomic History    Marital status: Single     Spouse name: Not on file    Number of children: Not on file    Years of education: Not on file    Highest education level: Not on file   Occupational History    Not on file   Social Needs    Financial resource strain: Not on file    Food insecurity     Worry: Not on file     Inability: Not on file    Transportation needs     Medical: Not on file     Non-medical: Not on file   Tobacco Use    Smoking status: Former Smoker     Quit date: 10/2/2010     Years since quitting: 10.5    Smokeless tobacco: Never Used   Substance and Sexual Activity    Alcohol use: Yes     Comment: occasional    Drug use: No    Sexual activity: Yes     Partners: Male   Lifestyle    Physical activity     Days per week: Not on file     Minutes per session: Not on file    Stress: Not on file   Relationships    Social connections     Talks on phone: Not on file     Gets together: Not on file     Attends Spiritism service: Not on file     Active member of club or organization: Not on file     Attends meetings of clubs or organizations: Not on file     Relationship 3. Will coordinate timing of upcoming surgery with Dr. Mayur Bauer office after patient has upcoming GI evaluation       Yung Magaña DO  Attending Physician Statement  I have discussed the case with Dr Nemesio Granado, including pertinent history and exam findings with the resident. I have seen and examined the patient and the key elements of the encounter have been performed by me. I agree with the assessment, plan and orders as documented by the resident.       Electronically signed by Dao Medina DO  on 5/10/2021 at 3:01 PM (V5) oriented

## 2025-05-22 NOTE — ED PROVIDER NOTE - ATTENDING CONTRIBUTION TO CARE
GEN - NAD; well appearing; A+O x3   HEAD - NC/AT   EYES- PERRL, EOMI  ENT: Airway patent, mmm, Oral cavity and pharynx normal. No inflammation, swelling, exudate, or lesions.  NECK: Neck supple  PULMONARY - CTA b/l, symmetric breath sounds.   CARDIAC -s1s2, RRR, no M,G,R  ABDOMEN - +BS, ND, NT, soft, no guarding, no rebound, no masses   RECTAL-performed with dr. carcamo-  BACK - no CVA tenderness, Normal  spine   EXTREMITIES - FROM, symmetric pulses, capillary refill < 2 seconds, no edema   SKIN - no rash or bruising   NEUROLOGIC - alert, speech clear, no focal deficits  PSYCH -nl mood/affect, nl insight. GEN - NAD; well appearing; A+O x3   EYES- PERRL, EOMI  ENT: Airway patent, mmm, Oral cavity and pharynx normal. No inflammation, swelling, exudate, or lesions.  NECK: Neck supple  PULMONARY - CTA b/l, symmetric breath sounds.   CARDIAC -s1s2, RRR, no M,G,R  ABDOMEN - +BS, ND, NT, soft, no guarding, no rebound, no masses   RECTAL-performed with dr. carcamo-multiple small perianal sores and small white blisters, tender to touch, no surrounding induration or erythema, no regions of esau fluctuance, no extension to vaginal region.  BACK - no CVA tenderness, Normal  spine   EXTREMITIES - FROM, symmetric pulses, capillary refill < 2 seconds, no edema   SKIN - no rash or bruising   NEUROLOGIC - alert, speech clear, no focal deficits  PSYCH -nl mood/affect, nl insight.   used for eval-34 year old female otherwise healthy presenting to ed with concern for small bumps surrounding her anal region which have been painful to touch. Reports she did start using a gel to the area and shaved the region prior to onset. Reports she is sexually active with one current partner (has had others in past). No abnl vag dc or bleeding. NO fevers, chills, cp, sob, abd pain, vomiting, diarrhea, dysuria. Pain and bumps are localized to region around anus. On exam has multiple small sores and blisters to the area that are tender to touch. Reports no h/o of std and no hx of herpes. Discussed concern for possible reaction/infection to shaving/gel v. possible herpes lesions-discussed prescription of valtrex in case of herpes, lesions swabbed. also balta provide topical abx for possible folliculitis. Patient aware that results will not come back today and instructed to f/u with her pmd/gyn withn next few days, and return precautions discussed. GEN - NAD; well appearing; A+O x3   EYES- PERRL, EOMI  ENT: Airway patent, mmm, Oral cavity and pharynx normal. No inflammation, swelling, exudate, or lesions.  NECK: Neck supple  PULMONARY - CTA b/l, symmetric breath sounds.   CARDIAC -s1s2, RRR, no M,G,R  ABDOMEN - +BS, ND, NT, soft, no guarding, no rebound, no masses   RECTAL-performed with dr. carcamo-multiple small perianal sores and small white blisters, tender to touch, no surrounding induration or erythema, no regions of esau fluctuance, no extension to vaginal region.  BACK - no CVA tenderness, Normal  spine   EXTREMITIES - FROM, symmetric pulses, capillary refill < 2 seconds, no edema   SKIN - no rash or bruising   NEUROLOGIC - alert, speech clear, no focal deficits  PSYCH -nl mood/affect, nl insight.   used for eval-34 year old female otherwise healthy presenting to ed with concern for small bumps surrounding her anal region which have been painful to touch. Reports she did start using a gel to the area and shaved the region prior to onset. Reports she is sexually active with one current partner (has had others in past). No abnl vag dc or bleeding. NO fevers, chills, cp, sob, abd pain, vomiting, diarrhea, dysuria. Pain and bumps are localized to region around anus. On exam has multiple small sores and blisters to the area that are tender to touch. Reports no h/o of std and no hx of herpes. Discussed concern for possible reaction/infection to shaving/gel v. possible herpes lesions-discussed prescription of valtrex in case of herpes, lesions swabbed. also balta provide topical abx for possible folliculitis. Reports she is not pregnant. Patient aware that results will not come back today and instructed to f/u with her pmd/gyn withn next few days, and return precautions discussed.

## 2025-05-22 NOTE — ED ADULT NURSE NOTE - OBJECTIVE STATEMENT
C/O hemorrhoids, so came in for evaluation. She is A&Ox4, in no acute distress. Disposition by MD. Meds given.

## 2025-05-22 NOTE — ED PROVIDER NOTE - PATIENT PORTAL LINK FT
You can access the FollowMyHealth Patient Portal offered by [facility] by registering at the following website: [email or web address]. By joining [employer]’s FollowMyHealth portal, you will also be able to view your health information using other applications (apps) compatible with our system.

## 2025-05-22 NOTE — ED PROVIDER NOTE - NSFOLLOWUPINSTRUCTIONS_ED_ALL_ED_FT
Acudió a urgencias debido a dolor en el recto josseline la última semana. En urgencias, le realizamos análisis de laboratorio para evaluar si presenta signos de etiología infecciosa. Recibirá bonnie llamada en los próximos 2 o 3 días hábiles para informarle los resultados. Delton Valtrex 1 g 3 veces al día josseline los próximos 7 días y aplique ungüento de mupirocina en la nadira afectada. Si el resultado es positivo, solicite también la prueba a ng gian. Regrese a urgencias si experimenta empeoramiento del dolor, fiebre, escalofríos, jordan corporales o dificultad para defecar.

## 2025-05-22 NOTE — ED PROVIDER NOTE - PROGRESS NOTE DETAILS
We spoke with the patient using  in depth about the possibilities for diagnosis.  We informed her that symptoms may be due to herpes, but we are not certain at this time.  We informed her that we are ordering a test in order to determine whether or not this is the case, but we will treat her anyways.  We informed her to inform her partner if the results come back positive as he may to be tested as well.  We also reassured her that herpes can be picked up at a part of her life and is not necessarily by her current partner.

## 2025-05-23 LAB
C TRACH RRNA SPEC QL NAA+PROBE: SIGNIFICANT CHANGE UP
HSV+VZV DNA SPEC QL NAA+PROBE: ABNORMAL
N GONORRHOEA RRNA SPEC QL NAA+PROBE: SIGNIFICANT CHANGE UP
SPECIMEN SOURCE: SIGNIFICANT CHANGE UP
SPECIMEN SOURCE: SIGNIFICANT CHANGE UP

## 2025-05-24 NOTE — ED POST DISCHARGE NOTE - DETAILS
patient informed of results and advised to finish medicine. patient is aware that she is contagious until the lesions fully clear up. Advised f/u with GYN

## 2025-06-18 ENCOUNTER — LABORATORY RESULT (OUTPATIENT)
Age: 35
End: 2025-06-18

## 2025-06-18 ENCOUNTER — APPOINTMENT (OUTPATIENT)
Dept: INTERNAL MEDICINE | Facility: CLINIC | Age: 35
End: 2025-06-18
Payer: COMMERCIAL

## 2025-06-18 VITALS
SYSTOLIC BLOOD PRESSURE: 116 MMHG | TEMPERATURE: 98.2 F | HEIGHT: 63 IN | DIASTOLIC BLOOD PRESSURE: 75 MMHG | HEART RATE: 99 BPM | OXYGEN SATURATION: 100 % | BODY MASS INDEX: 23.74 KG/M2 | WEIGHT: 134 LBS

## 2025-06-18 PROBLEM — Z98.84 S/P BARIATRIC SURGERY: Status: ACTIVE | Noted: 2025-06-18

## 2025-06-18 PROBLEM — R53.83 FATIGUE: Status: ACTIVE | Noted: 2025-06-18

## 2025-06-18 PROBLEM — Z78.9 DOES NOT USE ILLICIT DRUGS: Status: ACTIVE | Noted: 2025-06-18

## 2025-06-18 PROBLEM — Z12.11 COLON CANCER SCREENING: Status: ACTIVE | Noted: 2025-06-18

## 2025-06-18 PROCEDURE — 36415 COLL VENOUS BLD VENIPUNCTURE: CPT

## 2025-06-18 PROCEDURE — 99395 PREV VISIT EST AGE 18-39: CPT

## 2025-06-20 ENCOUNTER — NON-APPOINTMENT (OUTPATIENT)
Age: 35
End: 2025-06-20

## 2025-06-20 LAB
ALBUMIN SERPL ELPH-MCNC: 4.1 G/DL
ALP BLD-CCNC: 117 U/L
ALT SERPL-CCNC: 14 U/L
ANION GAP SERPL CALC-SCNC: 14 MMOL/L
AST SERPL-CCNC: 21 U/L
BASOPHILS # BLD AUTO: 0.05 K/UL
BASOPHILS NFR BLD AUTO: 0.7 %
BILIRUB SERPL-MCNC: 0.6 MG/DL
BUN SERPL-MCNC: 11 MG/DL
CALCIUM SERPL-MCNC: 9.2 MG/DL
CHLORIDE SERPL-SCNC: 105 MMOL/L
CO2 SERPL-SCNC: 22 MMOL/L
CREAT SERPL-MCNC: 0.49 MG/DL
EGFRCR SERPLBLD CKD-EPI 2021: 126 ML/MIN/1.73M2
EOSINOPHIL # BLD AUTO: 0.13 K/UL
EOSINOPHIL NFR BLD AUTO: 1.8 %
FERRITIN SERPL-MCNC: 6 NG/ML
FOLATE SERPL-MCNC: 7.3 NG/ML
GLUCOSE SERPL-MCNC: 90 MG/DL
HCT VFR BLD CALC: 26.4 %
HGB BLD-MCNC: 7.4 G/DL
IMM GRANULOCYTES NFR BLD AUTO: 0.3 %
IRON SATN MFR SERPL: 2 %
IRON SERPL-MCNC: 7 UG/DL
LYMPHOCYTES # BLD AUTO: 1.42 K/UL
LYMPHOCYTES NFR BLD AUTO: 19.7 %
MAN DIFF?: NORMAL
MCHC RBC-ENTMCNC: 17.7 PG
MCHC RBC-ENTMCNC: 28 G/DL
MCV RBC AUTO: 63 FL
MONOCYTES # BLD AUTO: 0.54 K/UL
MONOCYTES NFR BLD AUTO: 7.5 %
NEUTROPHILS # BLD AUTO: 5.05 K/UL
NEUTROPHILS NFR BLD AUTO: 70 %
PLATELET # BLD AUTO: 386 K/UL
POTASSIUM SERPL-SCNC: 4.2 MMOL/L
PROT SERPL-MCNC: 7 G/DL
RBC # BLD: 4.19 M/UL
RBC # FLD: 20.1 %
SODIUM SERPL-SCNC: 140 MMOL/L
TIBC SERPL-MCNC: 437 UG/DL
TSH SERPL-ACNC: 0.82 UIU/ML
UIBC SERPL-MCNC: 429 UG/DL
VIT B12 SERPL-MCNC: 446 PG/ML
WBC # FLD AUTO: 7.21 K/UL

## 2025-06-23 ENCOUNTER — EMERGENCY (EMERGENCY)
Facility: HOSPITAL | Age: 35
LOS: 1 days | End: 2025-06-23
Attending: EMERGENCY MEDICINE | Admitting: EMERGENCY MEDICINE

## 2025-06-23 VITALS
DIASTOLIC BLOOD PRESSURE: 60 MMHG | HEART RATE: 93 BPM | SYSTOLIC BLOOD PRESSURE: 100 MMHG | RESPIRATION RATE: 15 BRPM | TEMPERATURE: 98 F | OXYGEN SATURATION: 100 % | WEIGHT: 134.92 LBS

## 2025-06-23 VITALS
HEART RATE: 77 BPM | DIASTOLIC BLOOD PRESSURE: 65 MMHG | SYSTOLIC BLOOD PRESSURE: 101 MMHG | TEMPERATURE: 98 F | OXYGEN SATURATION: 100 % | RESPIRATION RATE: 18 BRPM

## 2025-06-23 DIAGNOSIS — Z98.891 HISTORY OF UTERINE SCAR FROM PREVIOUS SURGERY: Chronic | ICD-10-CM

## 2025-06-23 LAB
ADD ON TEST-SPECIMEN IN LAB: SIGNIFICANT CHANGE UP
ALBUMIN SERPL ELPH-MCNC: 4.2 G/DL — SIGNIFICANT CHANGE UP (ref 3.3–5)
ALP SERPL-CCNC: 118 U/L — SIGNIFICANT CHANGE UP (ref 40–120)
ALT FLD-CCNC: 12 U/L — SIGNIFICANT CHANGE UP (ref 4–33)
ANION GAP SERPL CALC-SCNC: 14 MMOL/L — SIGNIFICANT CHANGE UP (ref 7–14)
APTT BLD: 30.7 SEC — SIGNIFICANT CHANGE UP (ref 26.1–36.8)
AST SERPL-CCNC: 22 U/L — SIGNIFICANT CHANGE UP (ref 4–32)
BASOPHILS # BLD AUTO: 0.06 K/UL — SIGNIFICANT CHANGE UP (ref 0–0.2)
BASOPHILS # BLD AUTO: 0.08 K/UL — SIGNIFICANT CHANGE UP (ref 0–0.2)
BASOPHILS NFR BLD AUTO: 1 % — SIGNIFICANT CHANGE UP (ref 0–2)
BASOPHILS NFR BLD AUTO: 1.3 % — SIGNIFICANT CHANGE UP (ref 0–2)
BILIRUB SERPL-MCNC: 0.8 MG/DL — SIGNIFICANT CHANGE UP (ref 0.2–1.2)
BLD GP AB SCN SERPL QL: NEGATIVE — SIGNIFICANT CHANGE UP
BUN SERPL-MCNC: 11 MG/DL — SIGNIFICANT CHANGE UP (ref 7–23)
CALCIUM SERPL-MCNC: 9.1 MG/DL — SIGNIFICANT CHANGE UP (ref 8.4–10.5)
CHLORIDE SERPL-SCNC: 105 MMOL/L — SIGNIFICANT CHANGE UP (ref 98–107)
CO2 SERPL-SCNC: 20 MMOL/L — LOW (ref 22–31)
CREAT SERPL-MCNC: 0.53 MG/DL — SIGNIFICANT CHANGE UP (ref 0.5–1.3)
EGFR: 124 ML/MIN/1.73M2 — SIGNIFICANT CHANGE UP
EGFR: 124 ML/MIN/1.73M2 — SIGNIFICANT CHANGE UP
EOSINOPHIL # BLD AUTO: 0.1 K/UL — SIGNIFICANT CHANGE UP (ref 0–0.5)
EOSINOPHIL # BLD AUTO: 0.19 K/UL — SIGNIFICANT CHANGE UP (ref 0–0.5)
EOSINOPHIL NFR BLD AUTO: 1.7 % — SIGNIFICANT CHANGE UP (ref 0–6)
EOSINOPHIL NFR BLD AUTO: 3 % — SIGNIFICANT CHANGE UP (ref 0–6)
GLUCOSE SERPL-MCNC: 95 MG/DL — SIGNIFICANT CHANGE UP (ref 70–99)
HCT VFR BLD CALC: 25.7 % — LOW (ref 34.5–45)
HCT VFR BLD CALC: 28.9 % — LOW (ref 34.5–45)
HGB BLD-MCNC: 7.4 G/DL — LOW (ref 11.5–15.5)
HGB BLD-MCNC: 8.6 G/DL — LOW (ref 11.5–15.5)
IMM GRANULOCYTES # BLD AUTO: 0.01 K/UL — SIGNIFICANT CHANGE UP (ref 0–0.07)
IMM GRANULOCYTES # BLD AUTO: 0.02 K/UL — SIGNIFICANT CHANGE UP (ref 0–0.07)
IMM GRANULOCYTES NFR BLD AUTO: 0.2 % — SIGNIFICANT CHANGE UP (ref 0–0.9)
IMM GRANULOCYTES NFR BLD AUTO: 0.3 % — SIGNIFICANT CHANGE UP (ref 0–0.9)
INR BLD: 0.96 RATIO — SIGNIFICANT CHANGE UP (ref 0.85–1.16)
LYMPHOCYTES # BLD AUTO: 1.42 K/UL — SIGNIFICANT CHANGE UP (ref 1–3.3)
LYMPHOCYTES # BLD AUTO: 1.73 K/UL — SIGNIFICANT CHANGE UP (ref 1–3.3)
LYMPHOCYTES NFR BLD AUTO: 23.6 % — SIGNIFICANT CHANGE UP (ref 13–44)
LYMPHOCYTES NFR BLD AUTO: 27 % — SIGNIFICANT CHANGE UP (ref 13–44)
MCHC RBC-ENTMCNC: 17.2 PG — LOW (ref 27–34)
MCHC RBC-ENTMCNC: 18.9 PG — LOW (ref 27–34)
MCHC RBC-ENTMCNC: 28.8 G/DL — LOW (ref 32–36)
MCHC RBC-ENTMCNC: 29.8 G/DL — LOW (ref 32–36)
MCV RBC AUTO: 59.9 FL — LOW (ref 80–100)
MCV RBC AUTO: 63.4 FL — LOW (ref 80–100)
MONOCYTES # BLD AUTO: 0.45 K/UL — SIGNIFICANT CHANGE UP (ref 0–0.9)
MONOCYTES # BLD AUTO: 0.48 K/UL — SIGNIFICANT CHANGE UP (ref 0–0.9)
MONOCYTES NFR BLD AUTO: 7 % — SIGNIFICANT CHANGE UP (ref 2–14)
MONOCYTES NFR BLD AUTO: 8 % — SIGNIFICANT CHANGE UP (ref 2–14)
NEUTROPHILS # BLD AUTO: 3.94 K/UL — SIGNIFICANT CHANGE UP (ref 1.8–7.4)
NEUTROPHILS # BLD AUTO: 3.94 K/UL — SIGNIFICANT CHANGE UP (ref 1.8–7.4)
NEUTROPHILS NFR BLD AUTO: 61.5 % — SIGNIFICANT CHANGE UP (ref 43–77)
NEUTROPHILS NFR BLD AUTO: 65.4 % — SIGNIFICANT CHANGE UP (ref 43–77)
NRBC # BLD AUTO: 0 K/UL — SIGNIFICANT CHANGE UP (ref 0–0)
NRBC # BLD AUTO: 0 K/UL — SIGNIFICANT CHANGE UP (ref 0–0)
NRBC # FLD: 0 K/UL — SIGNIFICANT CHANGE UP (ref 0–0)
NRBC # FLD: 0 K/UL — SIGNIFICANT CHANGE UP (ref 0–0)
NRBC BLD AUTO-RTO: 0 /100 WBCS — SIGNIFICANT CHANGE UP (ref 0–0)
NRBC BLD AUTO-RTO: 0 /100 WBCS — SIGNIFICANT CHANGE UP (ref 0–0)
PLATELET # BLD AUTO: 343 K/UL — SIGNIFICANT CHANGE UP (ref 150–400)
PLATELET # BLD AUTO: 397 K/UL — SIGNIFICANT CHANGE UP (ref 150–400)
PMV BLD: 9.3 FL — SIGNIFICANT CHANGE UP (ref 7–13)
PMV BLD: 9.9 FL — SIGNIFICANT CHANGE UP (ref 7–13)
POTASSIUM SERPL-MCNC: 4.2 MMOL/L — SIGNIFICANT CHANGE UP (ref 3.5–5.3)
POTASSIUM SERPL-SCNC: 4.2 MMOL/L — SIGNIFICANT CHANGE UP (ref 3.5–5.3)
PROT SERPL-MCNC: 7.5 G/DL — SIGNIFICANT CHANGE UP (ref 6–8.3)
PROTHROM AB SERPL-ACNC: 11.4 SEC — SIGNIFICANT CHANGE UP (ref 9.9–13.4)
RBC # BLD: 4.29 M/UL — SIGNIFICANT CHANGE UP (ref 3.8–5.2)
RBC # BLD: 4.56 M/UL — SIGNIFICANT CHANGE UP (ref 3.8–5.2)
RBC # FLD: 18.6 % — HIGH (ref 10.3–14.5)
RBC # FLD: 21.7 % — HIGH (ref 10.3–14.5)
RH IG SCN BLD-IMP: POSITIVE — SIGNIFICANT CHANGE UP
SODIUM SERPL-SCNC: 139 MMOL/L — SIGNIFICANT CHANGE UP (ref 135–145)
WBC # BLD: 6.02 K/UL — SIGNIFICANT CHANGE UP (ref 3.8–10.5)
WBC # BLD: 6.4 K/UL — SIGNIFICANT CHANGE UP (ref 3.8–10.5)
WBC # FLD AUTO: 6.02 K/UL — SIGNIFICANT CHANGE UP (ref 3.8–10.5)
WBC # FLD AUTO: 6.4 K/UL — SIGNIFICANT CHANGE UP (ref 3.8–10.5)

## 2025-06-23 PROCEDURE — 99285 EMERGENCY DEPT VISIT HI MDM: CPT

## 2025-06-23 NOTE — ED PROVIDER NOTE - NSFOLLOWUPINSTRUCTIONS_ED_ALL_ED_FT
Your hemoglobin was 7.4 when you came to the ER. You received 1 unit of packed red blood cells. Your repeat hemoglobin is 8.6.     Please follow up with your hematologist.     Return to the ER if you develop chest pain, difficulty breathing, lightheadedness, dizziness, loss of consciousness, bleeding or any other concerns.

## 2025-06-23 NOTE — ED ADULT NURSE NOTE - IN ACCORDANCE WITH [place] LAW, WE OFFER EVERY PATIENT A HEPATITIS C TEST. WOULD YOU LIKE TO BE TESTED TODAY?
----- Message from Marifer Grgeory MD sent at 8/17/2020 11:35 AM CDT -----  All labs essentially normal--please have her schedule her follow up with me after her ultrasound   Thank you    The 10-year ASCVD risk score (Wang PIZARRO Jr., et al., 2013) is: 5%    Values used to calculate the score:      Age: 45 years      Sex: Female      Is Non- : No      Diabetic: No      Tobacco smoker: Yes      Systolic Blood Pressure: 138 mmHg      Is BP treated: Yes      HDL Cholesterol: 64 mg/dL      Total Cholesterol: 246 mg/dL    
Spoke with patient and verbalized understanding with no further questions or concerns.  
Opt out

## 2025-06-23 NOTE — ED ADULT TRIAGE NOTE - CHIEF COMPLAINT QUOTE
ambulatory, sent by MD, reports an abnormal lab result for a low hemoglobin level. endorsing feeling "tired". denies chest pain or SOB. denies pertinent medical history.

## 2025-06-23 NOTE — ED PROVIDER NOTE - PROGRESS NOTE DETAILS
Chitra Vance DO (PGY-2): Patient signed out to me at shift change. Known hx of anemia, hgb 7.4. Received 1u prbc with appropriate response, repet hgb 8.6. Patient tolerated well, stable for discharge.

## 2025-06-23 NOTE — ED PROVIDER NOTE - CLINICAL SUMMARY MEDICAL DECISION MAKING FREE TEXT BOX
34f, h/o iron deficiency anemia 2/2 heavy bleeding, previously transfused in past, presenting to ed with report of low hb of 7.4 found outpatient, comes to ed for transfusion, has heme f/u in next week, no active bleeding at this time. Plan for confirmatory labs, had risk/benefit discussion regarding transfusion with help of  and patient consent to transfusion if hb levels confirmed around what they were outpt. On exam well appearing, vss, unlabored breathing, clear lungs, abd soft/nt.

## 2025-06-23 NOTE — ED PROVIDER NOTE - PATIENT PORTAL LINK FT
You can access the FollowMyHealth Patient Portal offered by [facility] by registering at the following website: [email or web address]. By joining [employer]’s FollowMyHealth portal, you will also be able to view your health information using other applications (apps) compatible with our system.

## 2025-06-23 NOTE — ED PROVIDER NOTE - OBJECTIVE STATEMENT
language line used for eval.  34f, h/o iron deficiency anemia 2/2 heavy periods (not bleeding at this time), sent in by her md for low hb (7.4) found outpt, reporting occ feelings of generalized weakness and fatigue for last few months. Reports she has been transfused for same in the past years ago. Has appt with hematology in next week for f/u. Given she wasn't bleeding her doctor told her she could either wait for her heme appt or come to ed for transfusion before her f/u appt. She reports no active bleeding at this time, no ha, no sob, no palps, no dizziness, no abd pain or cp, no ha, no vision changes, no dysuria, hematuria, melena. brbpr.

## 2025-06-23 NOTE — ED ADULT TRIAGE NOTE - WEIGHT IN LBS
AMI Week 1 Survey      Responses   [facility] [place] patient discharged from?  [place]   Does the patient have one of the following disease processes/diagnoses(primary or secondary)?  Acute MI (STEMI,NSTEMI)   Week 1 attempt successful?  No   Unsuccessful attempts  Attempt 1          Nicolette Preciado RN   134.9

## 2025-06-23 NOTE — ED ADULT NURSE NOTE - OBJECTIVE STATEMENT
Presents to ED for generalized fatigue, low hemoglobin per MD. She has history of anemia. Patient A&Ox4, in no acute distress. She has endorsed heavy menstruation and states that she has needed blood transfusion in the past. Last blood transfusion October 2024. No active bleed. No pain, CP, SOB, fevers, chills. Respirations even, unlabored on room air. No pallor, no cyanosis. 18G long cath IV placed right AC, labs drawn and sent.

## 2025-07-08 ENCOUNTER — APPOINTMENT (OUTPATIENT)
Dept: GASTROENTEROLOGY | Facility: CLINIC | Age: 35
End: 2025-07-08
Payer: COMMERCIAL

## 2025-07-08 PROCEDURE — 99214 OFFICE O/P EST MOD 30 MIN: CPT | Mod: 93

## 2025-07-23 ENCOUNTER — MED ADMIN CHARGE (OUTPATIENT)
Age: 35
End: 2025-07-23

## 2025-07-23 ENCOUNTER — APPOINTMENT (OUTPATIENT)
Dept: INTERNAL MEDICINE | Facility: CLINIC | Age: 35
End: 2025-07-23
Payer: COMMERCIAL

## 2025-07-23 VITALS
SYSTOLIC BLOOD PRESSURE: 113 MMHG | DIASTOLIC BLOOD PRESSURE: 72 MMHG | TEMPERATURE: 96.9 F | OXYGEN SATURATION: 100 % | BODY MASS INDEX: 23.74 KG/M2 | WEIGHT: 134 LBS | HEART RATE: 103 BPM

## 2025-07-23 DIAGNOSIS — Z23 ENCOUNTER FOR IMMUNIZATION: ICD-10-CM

## 2025-07-23 DIAGNOSIS — Z02.1 ENCOUNTER FOR PRE-EMPLOYMENT EXAMINATION: ICD-10-CM

## 2025-07-23 PROCEDURE — 90707 MMR VACCINE SC: CPT

## 2025-07-23 PROCEDURE — 90471 IMMUNIZATION ADMIN: CPT

## 2025-08-11 ENCOUNTER — APPOINTMENT (OUTPATIENT)
Dept: GASTROENTEROLOGY | Facility: HOSPITAL | Age: 35
End: 2025-08-11

## (undated) DEVICE — DRSG BANDAID 0.75X3"

## (undated) DEVICE — SOLIDIFIER ISOLYZER 2000 CC

## (undated) DEVICE — TUBING IV SET GRAVITY 1Y 78" MACRO

## (undated) DEVICE — DENTURE CUP PINK

## (undated) DEVICE — SOL IRR POUR NS 0.9% 500ML

## (undated) DEVICE — PACK IV START WITH CHG

## (undated) DEVICE — DRSG 2X2

## (undated) DEVICE — CATH IV SAFE BC 22G X 1" (BLUE)

## (undated) DEVICE — UNDERPAD LINEN SAVER 17 X 24"

## (undated) DEVICE — NDL HYPO SAFE 22G X 1" (BLACK)

## (undated) DEVICE — SYR LUER LOK 3CC

## (undated) DEVICE — LUBRICATING JELLY HR ONE SHOT 3G

## (undated) DEVICE — FORCEP RADIAL JAW 4 PEDIATRIC W NDL 1.8MM 2MM 160CM DISP

## (undated) DEVICE — GOWN LG

## (undated) DEVICE — BIOPSY FORCEP RADIAL JAW 4 STANDARD WITH NEEDLE

## (undated) DEVICE — Device

## (undated) DEVICE — DVC INFLATION CRE STERIFLATE

## (undated) DEVICE — BITE BLOCK ADULT 20 X 27MM (GREEN)

## (undated) DEVICE — ELCTR ECG CONDUCTIVE ADHESIVE

## (undated) DEVICE — WARMING BLANKET FULL ADULT

## (undated) DEVICE — SALIVA EJECTOR (BLUE)

## (undated) DEVICE — CONTAINER FORMALIN 80ML YELLOW

## (undated) DEVICE — BIOPSY FORCEP COLD DISP

## (undated) DEVICE — LABELS BLANK W PEN

## (undated) DEVICE — ANESTHESIA CIRCUIT ADULT HMEF

## (undated) DEVICE — BASIN EMESIS 10IN GRADUATED MAUVE

## (undated) DEVICE — SOL INJ NS 0.9% 500ML 1-PORT

## (undated) DEVICE — VENODYNE/SCD SLEEVE CALF MEDIUM

## (undated) DEVICE — TUBING MEDI-VAC W MAXIGRIP CONNECTORS 1/4"X6'

## (undated) DEVICE — DRSG CURITY GAUZE SPONGE 4 X 4" 12-PLY NON-STERILE

## (undated) DEVICE — KIT ENDO PROCEDURE CUST W/VLV

## (undated) DEVICE — CLAMP BX HOT RAD JAW 3

## (undated) DEVICE — CONTAINER FORMALIN 10% 20ML

## (undated) DEVICE — TUBING IV SET GRAVITY 3Y 100" MACRO

## (undated) DEVICE — MASK LRG MED AND HIGH O2 CONC M TO M 10FT